# Patient Record
Sex: MALE | Race: WHITE | Employment: OTHER | URBAN - METROPOLITAN AREA
[De-identification: names, ages, dates, MRNs, and addresses within clinical notes are randomized per-mention and may not be internally consistent; named-entity substitution may affect disease eponyms.]

---

## 2017-01-01 ENCOUNTER — APPOINTMENT (OUTPATIENT)
Dept: CT IMAGING | Facility: HOSPITAL | Age: 82
End: 2017-01-01
Attending: EMERGENCY MEDICINE
Payer: MEDICARE

## 2017-01-01 ENCOUNTER — APPOINTMENT (OUTPATIENT)
Dept: CT IMAGING | Facility: HOSPITAL | Age: 82
DRG: 871 | End: 2017-01-01
Attending: INTERNAL MEDICINE
Payer: MEDICARE

## 2017-01-01 ENCOUNTER — OFFICE VISIT (OUTPATIENT)
Dept: RHEUMATOLOGY | Facility: CLINIC | Age: 82
End: 2017-01-01

## 2017-01-01 ENCOUNTER — LAB ENCOUNTER (OUTPATIENT)
Dept: LAB | Age: 82
End: 2017-01-01
Attending: INTERNAL MEDICINE
Payer: MEDICARE

## 2017-01-01 ENCOUNTER — ANESTHESIA (OUTPATIENT)
Dept: SURGERY | Facility: HOSPITAL | Age: 82
End: 2017-01-01

## 2017-01-01 ENCOUNTER — HOSPITAL ENCOUNTER (INPATIENT)
Facility: HOSPITAL | Age: 82
LOS: 6 days | Discharge: NURSING, REHAB OR LONG-TERM CARE | DRG: 871 | End: 2017-01-01
Attending: EMERGENCY MEDICINE | Admitting: HOSPITALIST
Payer: MEDICARE

## 2017-01-01 ENCOUNTER — SNF DISCHARGE (OUTPATIENT)
Dept: INTERNAL MEDICINE CLINIC | Age: 82
End: 2017-01-01

## 2017-01-01 ENCOUNTER — HOSPITAL ENCOUNTER (OUTPATIENT)
Facility: HOSPITAL | Age: 82
Setting detail: HOSPITAL OUTPATIENT SURGERY
Discharge: HOME | End: 2017-01-01
Attending: SURGERY | Admitting: SURGERY
Payer: MEDICARE

## 2017-01-01 ENCOUNTER — APPOINTMENT (OUTPATIENT)
Dept: CT IMAGING | Facility: HOSPITAL | Age: 82
DRG: 871 | End: 2017-01-01
Attending: EMERGENCY MEDICINE
Payer: MEDICARE

## 2017-01-01 ENCOUNTER — LAB REQUISITION (OUTPATIENT)
Dept: LAB | Facility: HOSPITAL | Age: 82
End: 2017-01-01
Attending: INTERNAL MEDICINE
Payer: MEDICARE

## 2017-01-01 ENCOUNTER — SURGERY (OUTPATIENT)
Age: 82
End: 2017-01-01

## 2017-01-01 ENCOUNTER — SNF VISIT (OUTPATIENT)
Dept: INTERNAL MEDICINE CLINIC | Age: 82
End: 2017-01-01

## 2017-01-01 ENCOUNTER — APPOINTMENT (OUTPATIENT)
Dept: GENERAL RADIOLOGY | Facility: HOSPITAL | Age: 82
DRG: 871 | End: 2017-01-01
Attending: EMERGENCY MEDICINE
Payer: MEDICARE

## 2017-01-01 ENCOUNTER — APPOINTMENT (OUTPATIENT)
Dept: CV DIAGNOSTICS | Facility: HOSPITAL | Age: 82
End: 2017-01-01
Attending: INTERNAL MEDICINE
Payer: MEDICARE

## 2017-01-01 ENCOUNTER — HOSPITAL ENCOUNTER (OUTPATIENT)
Facility: HOSPITAL | Age: 82
Setting detail: OBSERVATION
Discharge: HOME | End: 2017-01-01
Attending: EMERGENCY MEDICINE | Admitting: INTERNAL MEDICINE
Payer: MEDICARE

## 2017-01-01 ENCOUNTER — ANESTHESIA EVENT (OUTPATIENT)
Dept: SURGERY | Facility: HOSPITAL | Age: 82
End: 2017-01-01

## 2017-01-01 ENCOUNTER — APPOINTMENT (OUTPATIENT)
Dept: GENERAL RADIOLOGY | Facility: HOSPITAL | Age: 82
End: 2017-01-01
Attending: EMERGENCY MEDICINE
Payer: MEDICARE

## 2017-01-01 VITALS
SYSTOLIC BLOOD PRESSURE: 191 MMHG | HEART RATE: 67 BPM | OXYGEN SATURATION: 95 % | WEIGHT: 156.75 LBS | RESPIRATION RATE: 20 BRPM | DIASTOLIC BLOOD PRESSURE: 95 MMHG | BODY MASS INDEX: 25 KG/M2 | TEMPERATURE: 98 F

## 2017-01-01 VITALS
HEART RATE: 63 BPM | TEMPERATURE: 99 F | DIASTOLIC BLOOD PRESSURE: 76 MMHG | SYSTOLIC BLOOD PRESSURE: 133 MMHG | WEIGHT: 155.31 LBS | OXYGEN SATURATION: 99 % | RESPIRATION RATE: 14 BRPM | BODY MASS INDEX: 25 KG/M2

## 2017-01-01 VITALS
DIASTOLIC BLOOD PRESSURE: 80 MMHG | HEART RATE: 88 BPM | OXYGEN SATURATION: 95 % | RESPIRATION RATE: 24 BRPM | SYSTOLIC BLOOD PRESSURE: 154 MMHG | WEIGHT: 146 LBS | BODY MASS INDEX: 24 KG/M2

## 2017-01-01 VITALS
HEIGHT: 72 IN | SYSTOLIC BLOOD PRESSURE: 112 MMHG | DIASTOLIC BLOOD PRESSURE: 72 MMHG | BODY MASS INDEX: 20.32 KG/M2 | RESPIRATION RATE: 18 BRPM | HEART RATE: 60 BPM | WEIGHT: 150 LBS

## 2017-01-01 VITALS
DIASTOLIC BLOOD PRESSURE: 76 MMHG | RESPIRATION RATE: 20 BRPM | TEMPERATURE: 98 F | OXYGEN SATURATION: 97 % | SYSTOLIC BLOOD PRESSURE: 162 MMHG | HEART RATE: 62 BPM

## 2017-01-01 VITALS
RESPIRATION RATE: 18 BRPM | HEART RATE: 74 BPM | TEMPERATURE: 98 F | DIASTOLIC BLOOD PRESSURE: 68 MMHG | OXYGEN SATURATION: 97 % | SYSTOLIC BLOOD PRESSURE: 118 MMHG

## 2017-01-01 VITALS
TEMPERATURE: 98 F | HEIGHT: 66 IN | BODY MASS INDEX: 24.11 KG/M2 | RESPIRATION RATE: 16 BRPM | WEIGHT: 150 LBS | OXYGEN SATURATION: 100 % | SYSTOLIC BLOOD PRESSURE: 145 MMHG | HEART RATE: 67 BPM | DIASTOLIC BLOOD PRESSURE: 65 MMHG

## 2017-01-01 VITALS
RESPIRATION RATE: 18 BRPM | HEART RATE: 62 BPM | TEMPERATURE: 97 F | DIASTOLIC BLOOD PRESSURE: 68 MMHG | SYSTOLIC BLOOD PRESSURE: 132 MMHG | OXYGEN SATURATION: 98 %

## 2017-01-01 VITALS — HEART RATE: 74 BPM | RESPIRATION RATE: 16 BRPM | SYSTOLIC BLOOD PRESSURE: 158 MMHG | DIASTOLIC BLOOD PRESSURE: 72 MMHG

## 2017-01-01 DIAGNOSIS — H35.30 MACULAR DEGENERATION, BILATERAL: ICD-10-CM

## 2017-01-01 DIAGNOSIS — E78.2 COMBINED HYPERLIPIDEMIA ASSOCIATED WITH TYPE 2 DIABETES MELLITUS (HCC): ICD-10-CM

## 2017-01-01 DIAGNOSIS — E55.9 VITAMIN D DEFICIENCY: ICD-10-CM

## 2017-01-01 DIAGNOSIS — I15.2 HYPERTENSION ASSOCIATED WITH DIABETES (HCC): ICD-10-CM

## 2017-01-01 DIAGNOSIS — N18.4 STAGE 4 CHRONIC KIDNEY DISEASE DUE TO TYPE 2 DIABETES MELLITUS (HCC): ICD-10-CM

## 2017-01-01 DIAGNOSIS — E03.9 IDIOPATHIC HYPOTHYROIDISM: ICD-10-CM

## 2017-01-01 DIAGNOSIS — K29.60 EROSIVE GASTRITIS: ICD-10-CM

## 2017-01-01 DIAGNOSIS — I10 HTN (HYPERTENSION): ICD-10-CM

## 2017-01-01 DIAGNOSIS — A41.9 SEPSIS, DUE TO UNSPECIFIED ORGANISM: ICD-10-CM

## 2017-01-01 DIAGNOSIS — E11.8 DIABETES MELLITUS WITH MULTIPLE COMPLICATIONS (HCC): ICD-10-CM

## 2017-01-01 DIAGNOSIS — L40.50 PSA (PSORIATIC ARTHRITIS) (HCC): Primary | ICD-10-CM

## 2017-01-01 DIAGNOSIS — E11.59 HYPERTENSION ASSOCIATED WITH DIABETES (HCC): ICD-10-CM

## 2017-01-01 DIAGNOSIS — C7A.8 NEUROENDOCRINE CARCINOMA (HCC): ICD-10-CM

## 2017-01-01 DIAGNOSIS — N39.0 URINARY TRACT INFECTION WITH HEMATURIA, SITE UNSPECIFIED: Primary | ICD-10-CM

## 2017-01-01 DIAGNOSIS — N30.01 ACUTE CYSTITIS WITH HEMATURIA: Primary | ICD-10-CM

## 2017-01-01 DIAGNOSIS — R31.9 URINARY TRACT INFECTION WITH HEMATURIA, SITE UNSPECIFIED: Primary | ICD-10-CM

## 2017-01-01 DIAGNOSIS — N40.1 BENIGN NON-NODULAR PROSTATIC HYPERPLASIA WITH LOWER URINARY TRACT SYMPTOMS: ICD-10-CM

## 2017-01-01 DIAGNOSIS — R53.1 WEAKNESS: ICD-10-CM

## 2017-01-01 DIAGNOSIS — D51.0 PERNICIOUS ANEMIA: ICD-10-CM

## 2017-01-01 DIAGNOSIS — M47.812 OSTEOARTHRITIS OF CERVICAL SPINE, UNSPECIFIED SPINAL OSTEOARTHRITIS COMPLICATION STATUS: ICD-10-CM

## 2017-01-01 DIAGNOSIS — H91.93 BILATERAL HEARING LOSS, UNSPECIFIED HEARING LOSS TYPE: ICD-10-CM

## 2017-01-01 DIAGNOSIS — D64.9 ANEMIA: Primary | ICD-10-CM

## 2017-01-01 DIAGNOSIS — E11.9 DM (DIABETES MELLITUS) (HCC): ICD-10-CM

## 2017-01-01 DIAGNOSIS — R55 SYNCOPE AND COLLAPSE: Primary | ICD-10-CM

## 2017-01-01 DIAGNOSIS — E87.6 HYPOKALEMIA: ICD-10-CM

## 2017-01-01 DIAGNOSIS — E11.69 COMBINED HYPERLIPIDEMIA ASSOCIATED WITH TYPE 2 DIABETES MELLITUS (HCC): ICD-10-CM

## 2017-01-01 DIAGNOSIS — E11.22 STAGE 4 CHRONIC KIDNEY DISEASE DUE TO TYPE 2 DIABETES MELLITUS (HCC): ICD-10-CM

## 2017-01-01 DIAGNOSIS — L40.9 PSORIASIS: ICD-10-CM

## 2017-01-01 DIAGNOSIS — I10 HTN (HYPERTENSION): Primary | ICD-10-CM

## 2017-01-01 DIAGNOSIS — E11.9 DIABETES MELLITUS (HCC): Primary | ICD-10-CM

## 2017-01-01 DIAGNOSIS — R69 ILLNESS: ICD-10-CM

## 2017-01-01 DIAGNOSIS — R53.1 WEAKNESS GENERALIZED: ICD-10-CM

## 2017-01-01 LAB
25-HYDROXYVITAMIN D (TOTAL): 28.6 NG/ML (ref 30–100)
ALBUMIN SERPL-MCNC: 2.6 G/DL (ref 3.5–4.8)
ALBUMIN SERPL-MCNC: 2.8 G/DL (ref 3.5–4.8)
ALBUMIN SERPL-MCNC: 3.1 G/DL (ref 3.5–4.8)
ALP LIVER SERPL-CCNC: 45 U/L (ref 45–117)
ALP LIVER SERPL-CCNC: 45 U/L (ref 45–117)
ALP LIVER SERPL-CCNC: 56 U/L (ref 45–117)
ALT SERPL-CCNC: 14 U/L (ref 17–63)
ALT SERPL-CCNC: 14 U/L (ref 17–63)
ALT SERPL-CCNC: 19 U/L (ref 17–63)
ANTIBODY SCREEN: NEGATIVE
APTT PPP: 27.3 SECONDS (ref 25–34)
AST SERPL-CCNC: 13 U/L (ref 15–41)
AST SERPL-CCNC: 14 U/L (ref 15–41)
AST SERPL-CCNC: 17 U/L (ref 15–41)
ATRIAL RATE: 116 BPM
BASOPHILS # BLD AUTO: 0.01 X10(3) UL (ref 0–0.1)
BASOPHILS # BLD AUTO: 0.02 X10(3) UL (ref 0–0.1)
BASOPHILS # BLD AUTO: 0.03 X10(3) UL (ref 0–0.1)
BASOPHILS # BLD AUTO: 0.05 X10(3) UL (ref 0–0.1)
BASOPHILS # BLD AUTO: 0.08 X10(3) UL (ref 0–0.1)
BASOPHILS NFR BLD AUTO: 0.1 %
BASOPHILS NFR BLD AUTO: 0.3 %
BASOPHILS NFR BLD AUTO: 0.3 %
BASOPHILS NFR BLD AUTO: 0.7 %
BASOPHILS NFR BLD AUTO: 1.3 %
BILIRUB SERPL-MCNC: 0.5 MG/DL (ref 0.1–2)
BILIRUB SERPL-MCNC: 0.6 MG/DL (ref 0.1–2)
BILIRUB SERPL-MCNC: 1.1 MG/DL (ref 0.1–2)
BILIRUB UR QL STRIP.AUTO: NEGATIVE
BLOOD TYPE BARCODE: 6200
BUN BLD-MCNC: 30 MG/DL (ref 8–20)
BUN BLD-MCNC: 33 MG/DL (ref 8–20)
BUN BLD-MCNC: 37 MG/DL (ref 8–20)
BUN BLD-MCNC: 41 MG/DL (ref 8–20)
BUN BLD-MCNC: 42 MG/DL (ref 8–20)
BUN BLD-MCNC: 47 MG/DL (ref 8–20)
BUN BLD-MCNC: 49 MG/DL (ref 8–20)
CALCIUM BLD-MCNC: 7.6 MG/DL (ref 8.3–10.3)
CALCIUM BLD-MCNC: 7.6 MG/DL (ref 8.3–10.3)
CALCIUM BLD-MCNC: 8 MG/DL (ref 8.3–10.3)
CALCIUM BLD-MCNC: 8.1 MG/DL (ref 8.3–10.3)
CALCIUM BLD-MCNC: 8.5 MG/DL (ref 8.3–10.3)
CALCIUM BLD-MCNC: 8.6 MG/DL (ref 8.3–10.3)
CALCIUM BLD-MCNC: 8.9 MG/DL (ref 8.3–10.3)
CHLORIDE: 106 MMOL/L (ref 101–111)
CHLORIDE: 110 MMOL/L (ref 101–111)
CHLORIDE: 111 MMOL/L (ref 101–111)
CHLORIDE: 112 MMOL/L (ref 101–111)
CHLORIDE: 114 MMOL/L (ref 101–111)
CLARITY UR REFRACT.AUTO: CLEAR
CO2: 19 MMOL/L (ref 22–32)
CO2: 19 MMOL/L (ref 22–32)
CO2: 21 MMOL/L (ref 22–32)
CO2: 22 MMOL/L (ref 22–32)
CO2: 22 MMOL/L (ref 22–32)
CO2: 23 MMOL/L (ref 22–32)
CO2: 23 MMOL/L (ref 22–32)
COLOR UR AUTO: YELLOW
CREAT BLD-MCNC: 2.01 MG/DL (ref 0.7–1.3)
CREAT BLD-MCNC: 2.04 MG/DL (ref 0.7–1.3)
CREAT BLD-MCNC: 2.06 MG/DL (ref 0.7–1.3)
CREAT BLD-MCNC: 2.13 MG/DL (ref 0.7–1.3)
CREAT BLD-MCNC: 2.31 MG/DL (ref 0.7–1.3)
CREAT BLD-MCNC: 2.34 MG/DL (ref 0.7–1.3)
CREAT BLD-MCNC: 2.35 MG/DL (ref 0.7–1.3)
CREAT BLD-MCNC: 2.44 MG/DL (ref 0.7–1.3)
CREAT BLD-MCNC: 2.65 MG/DL (ref 0.7–1.3)
CREAT BLD-MCNC: 2.65 MG/DL (ref 0.7–1.3)
EOSINOPHIL # BLD AUTO: 0.01 X10(3) UL (ref 0–0.3)
EOSINOPHIL # BLD AUTO: 0.03 X10(3) UL (ref 0–0.3)
EOSINOPHIL # BLD AUTO: 0.3 X10(3) UL (ref 0–0.3)
EOSINOPHIL # BLD AUTO: 0.37 X10(3) UL (ref 0–0.3)
EOSINOPHIL # BLD AUTO: 0.39 X10(3) UL (ref 0–0.3)
EOSINOPHIL # BLD AUTO: 0.49 X10(3) UL (ref 0–0.3)
EOSINOPHIL # BLD AUTO: 0.62 X10(3) UL (ref 0–0.3)
EOSINOPHIL NFR BLD AUTO: 0.1 %
EOSINOPHIL NFR BLD AUTO: 0.2 %
EOSINOPHIL NFR BLD AUTO: 4.2 %
EOSINOPHIL NFR BLD AUTO: 4.9 %
EOSINOPHIL NFR BLD AUTO: 6.2 %
EOSINOPHIL NFR BLD AUTO: 6.5 %
EOSINOPHIL NFR BLD AUTO: 6.5 %
ERYTHROCYTE [DISTWIDTH] IN BLOOD BY AUTOMATED COUNT: 15.6 % (ref 11.5–16)
ERYTHROCYTE [DISTWIDTH] IN BLOOD BY AUTOMATED COUNT: 15.9 % (ref 11.5–16)
ERYTHROCYTE [DISTWIDTH] IN BLOOD BY AUTOMATED COUNT: 17 % (ref 11.5–16)
ERYTHROCYTE [DISTWIDTH] IN BLOOD BY AUTOMATED COUNT: 17.1 % (ref 11.5–16)
ERYTHROCYTE [DISTWIDTH] IN BLOOD BY AUTOMATED COUNT: 17.2 % (ref 11.5–16)
ERYTHROCYTE [DISTWIDTH] IN BLOOD BY AUTOMATED COUNT: 17.3 % (ref 11.5–16)
ERYTHROCYTE [DISTWIDTH] IN BLOOD BY AUTOMATED COUNT: 17.5 % (ref 11.5–16)
ERYTHROCYTE [DISTWIDTH] IN BLOOD BY AUTOMATED COUNT: 17.6 % (ref 11.5–16)
ERYTHROCYTE [DISTWIDTH] IN BLOOD BY AUTOMATED COUNT: 18.1 % (ref 11.5–16)
GLUCOSE BLD-MCNC: 103 MG/DL (ref 65–99)
GLUCOSE BLD-MCNC: 116 MG/DL (ref 65–99)
GLUCOSE BLD-MCNC: 117 MG/DL (ref 70–99)
GLUCOSE BLD-MCNC: 119 MG/DL (ref 70–99)
GLUCOSE BLD-MCNC: 120 MG/DL (ref 65–99)
GLUCOSE BLD-MCNC: 121 MG/DL (ref 65–99)
GLUCOSE BLD-MCNC: 124 MG/DL (ref 70–99)
GLUCOSE BLD-MCNC: 125 MG/DL (ref 65–99)
GLUCOSE BLD-MCNC: 131 MG/DL (ref 65–99)
GLUCOSE BLD-MCNC: 139 MG/DL (ref 65–99)
GLUCOSE BLD-MCNC: 139 MG/DL (ref 70–99)
GLUCOSE BLD-MCNC: 140 MG/DL (ref 65–99)
GLUCOSE BLD-MCNC: 141 MG/DL (ref 65–99)
GLUCOSE BLD-MCNC: 145 MG/DL (ref 65–99)
GLUCOSE BLD-MCNC: 160 MG/DL (ref 65–99)
GLUCOSE BLD-MCNC: 165 MG/DL (ref 65–99)
GLUCOSE BLD-MCNC: 165 MG/DL (ref 65–99)
GLUCOSE BLD-MCNC: 166 MG/DL (ref 65–99)
GLUCOSE BLD-MCNC: 167 MG/DL (ref 65–99)
GLUCOSE BLD-MCNC: 168 MG/DL (ref 65–99)
GLUCOSE BLD-MCNC: 174 MG/DL (ref 65–99)
GLUCOSE BLD-MCNC: 176 MG/DL (ref 65–99)
GLUCOSE BLD-MCNC: 179 MG/DL (ref 65–99)
GLUCOSE BLD-MCNC: 181 MG/DL (ref 65–99)
GLUCOSE BLD-MCNC: 187 MG/DL (ref 65–99)
GLUCOSE BLD-MCNC: 199 MG/DL (ref 65–99)
GLUCOSE BLD-MCNC: 232 MG/DL (ref 65–99)
GLUCOSE BLD-MCNC: 261 MG/DL (ref 65–99)
GLUCOSE BLD-MCNC: 65 MG/DL (ref 70–99)
GLUCOSE BLD-MCNC: 83 MG/DL (ref 70–99)
GLUCOSE BLD-MCNC: 90 MG/DL (ref 65–99)
GLUCOSE BLD-MCNC: 95 MG/DL (ref 70–99)
GLUCOSE UR STRIP.AUTO-MCNC: 50 MG/DL
GLUCOSE UR STRIP.AUTO-MCNC: NEGATIVE MG/DL
GLUCOSE UR STRIP.AUTO-MCNC: NEGATIVE MG/DL
HAV AB SERPL IA-ACNC: 1523 PG/ML (ref 193–986)
HAV IGM SER QL: 2.1 MG/DL (ref 1.7–3)
HCT VFR BLD AUTO: 20 % (ref 37–53)
HCT VFR BLD AUTO: 24.8 % (ref 37–53)
HCT VFR BLD AUTO: 25.2 % (ref 37–53)
HCT VFR BLD AUTO: 25.6 % (ref 37–53)
HCT VFR BLD AUTO: 26.5 % (ref 37–53)
HCT VFR BLD AUTO: 26.7 % (ref 37–53)
HCT VFR BLD AUTO: 27 % (ref 37–53)
HCT VFR BLD AUTO: 28.1 % (ref 37–53)
HCT VFR BLD AUTO: 28.3 % (ref 37–53)
HGB BLD-MCNC: 6.6 G/DL (ref 13–17)
HGB BLD-MCNC: 8.3 G/DL (ref 13–17)
HGB BLD-MCNC: 8.4 G/DL (ref 13–17)
HGB BLD-MCNC: 8.5 G/DL (ref 13–17)
HGB BLD-MCNC: 8.8 G/DL (ref 13–17)
HGB BLD-MCNC: 8.9 G/DL (ref 13–17)
HGB BLD-MCNC: 8.9 G/DL (ref 13–17)
HGB BLD-MCNC: 9.4 G/DL (ref 13–17)
HGB BLD-MCNC: 9.4 G/DL (ref 13–17)
IMMATURE GRANULOCYTE COUNT: 0.03 X10(3) UL (ref 0–1)
IMMATURE GRANULOCYTE COUNT: 0.08 X10(3) UL (ref 0–1)
IMMATURE GRANULOCYTE COUNT: 0.12 X10(3) UL (ref 0–1)
IMMATURE GRANULOCYTE COUNT: 0.12 X10(3) UL (ref 0–1)
IMMATURE GRANULOCYTE COUNT: 0.2 X10(3) UL (ref 0–1)
IMMATURE GRANULOCYTE COUNT: 0.28 X10(3) UL (ref 0–1)
IMMATURE GRANULOCYTE COUNT: 0.35 X10(3) UL (ref 0–1)
IMMATURE GRANULOCYTE RATIO %: 0.5 %
IMMATURE GRANULOCYTE RATIO %: 0.9 %
IMMATURE GRANULOCYTE RATIO %: 1.1 %
IMMATURE GRANULOCYTE RATIO %: 1.5 %
IMMATURE GRANULOCYTE RATIO %: 1.8 %
IMMATURE GRANULOCYTE RATIO %: 2.7 %
IMMATURE GRANULOCYTE RATIO %: 3.6 %
INR BLD: 1.19 (ref 0.89–1.11)
KETONES UR STRIP.AUTO-MCNC: NEGATIVE MG/DL
LACTIC ACID: 0.8 MMOL/L (ref 0.5–2)
LACTIC ACID: 0.9 MMOL/L (ref 0.5–2)
LACTIC ACID: 1.8 MMOL/L (ref 0.5–2)
LEUKOCYTE ESTERASE UR QL STRIP.AUTO: NEGATIVE
LYMPHOCYTES # BLD AUTO: 0.14 X10(3) UL (ref 0.9–4)
LYMPHOCYTES # BLD AUTO: 0.45 X10(3) UL (ref 0.9–4)
LYMPHOCYTES # BLD AUTO: 0.5 X10(3) UL (ref 0.9–4)
LYMPHOCYTES # BLD AUTO: 0.78 X10(3) UL (ref 0.9–4)
LYMPHOCYTES # BLD AUTO: 0.86 X10(3) UL (ref 0.9–4)
LYMPHOCYTES # BLD AUTO: 0.97 X10(3) UL (ref 0.9–4)
LYMPHOCYTES # BLD AUTO: 1.16 X10(3) UL (ref 0.9–4)
LYMPHOCYTES NFR BLD AUTO: 0.9 %
LYMPHOCYTES NFR BLD AUTO: 11.5 %
LYMPHOCYTES NFR BLD AUTO: 12.1 %
LYMPHOCYTES NFR BLD AUTO: 16.3 %
LYMPHOCYTES NFR BLD AUTO: 3.8 %
LYMPHOCYTES NFR BLD AUTO: 6.2 %
LYMPHOCYTES NFR BLD AUTO: 9.8 %
M PROTEIN MFR SERPL ELPH: 5.8 G/DL (ref 6.1–8.3)
M PROTEIN MFR SERPL ELPH: 6 G/DL (ref 6.1–8.3)
M PROTEIN MFR SERPL ELPH: 6.4 G/DL (ref 6.1–8.3)
MCH RBC QN AUTO: 34.6 PG (ref 27–33.2)
MCH RBC QN AUTO: 34.9 PG (ref 27–33.2)
MCH RBC QN AUTO: 35 PG (ref 27–33.2)
MCH RBC QN AUTO: 35.3 PG (ref 27–33.2)
MCH RBC QN AUTO: 35.3 PG (ref 27–33.2)
MCH RBC QN AUTO: 35.7 PG (ref 27–33.2)
MCH RBC QN AUTO: 36.5 PG (ref 27–33.2)
MCH RBC QN AUTO: 36.8 PG (ref 27–33.2)
MCH RBC QN AUTO: 37.1 PG (ref 27–33.2)
MCHC RBC AUTO-ENTMCNC: 32.4 G/DL (ref 31–37)
MCHC RBC AUTO-ENTMCNC: 33 G/DL (ref 31–37)
MCHC RBC AUTO-ENTMCNC: 33.2 G/DL (ref 31–37)
MCHC RBC AUTO-ENTMCNC: 33.5 G/DL (ref 31–37)
MCHC RBC AUTO-ENTMCNC: 33.6 G/DL (ref 31–37)
MCHC RBC AUTO-ENTMCNC: 33.7 G/DL (ref 31–37)
MCHC RBC AUTO-ENTMCNC: 33.9 G/DL (ref 31–37)
MCV RBC AUTO: 104.5 FL (ref 80–99)
MCV RBC AUTO: 105.1 FL (ref 80–99)
MCV RBC AUTO: 106.4 FL (ref 80–99)
MCV RBC AUTO: 106.4 FL (ref 80–99)
MCV RBC AUTO: 107.2 FL (ref 80–99)
MCV RBC AUTO: 108 FL (ref 80–99)
MCV RBC AUTO: 108.2 FL (ref 80–99)
MCV RBC AUTO: 108.8 FL (ref 80–99)
MCV RBC AUTO: 112.4 FL (ref 80–99)
MONOCYTES # BLD AUTO: 0.26 X10(3) UL (ref 0.1–0.6)
MONOCYTES # BLD AUTO: 0.27 X10(3) UL (ref 0.1–0.6)
MONOCYTES # BLD AUTO: 0.45 X10(3) UL (ref 0.1–0.6)
MONOCYTES # BLD AUTO: 0.62 X10(3) UL (ref 0.1–0.6)
MONOCYTES # BLD AUTO: 0.83 X10(3) UL (ref 0.1–0.6)
MONOCYTES # BLD AUTO: 1.13 X10(3) UL (ref 0.1–0.6)
MONOCYTES # BLD AUTO: 1.19 X10(3) UL (ref 0.1–0.6)
MONOCYTES NFR BLD AUTO: 1.7 %
MONOCYTES NFR BLD AUTO: 11.1 %
MONOCYTES NFR BLD AUTO: 12.4 %
MONOCYTES NFR BLD AUTO: 19 %
MONOCYTES NFR BLD AUTO: 3.7 %
MONOCYTES NFR BLD AUTO: 4.7 %
MONOCYTES NFR BLD AUTO: 5.7 %
NEUTROPHIL ABS PRELIM: 11.84 X10 (3) UL (ref 1.3–6.7)
NEUTROPHIL ABS PRELIM: 14.82 X10 (3) UL (ref 1.3–6.7)
NEUTROPHIL ABS PRELIM: 3.38 X10 (3) UL (ref 1.3–6.7)
NEUTROPHIL ABS PRELIM: 5.07 X10 (3) UL (ref 1.3–6.7)
NEUTROPHIL ABS PRELIM: 6.11 X10 (3) UL (ref 1.3–6.7)
NEUTROPHIL ABS PRELIM: 6.19 X10 (3) UL (ref 1.3–6.7)
NEUTROPHIL ABS PRELIM: 6.24 X10 (3) UL (ref 1.3–6.7)
NEUTROPHILS # BLD AUTO: 11.84 X10(3) UL (ref 1.3–6.7)
NEUTROPHILS # BLD AUTO: 14.82 X10(3) UL (ref 1.3–6.7)
NEUTROPHILS # BLD AUTO: 3.38 X10(3) UL (ref 1.3–6.7)
NEUTROPHILS # BLD AUTO: 5.07 X10(3) UL (ref 1.3–6.7)
NEUTROPHILS # BLD AUTO: 6.11 X10(3) UL (ref 1.3–6.7)
NEUTROPHILS # BLD AUTO: 6.19 X10(3) UL (ref 1.3–6.7)
NEUTROPHILS # BLD AUTO: 6.24 X10(3) UL (ref 1.3–6.7)
NEUTROPHILS NFR BLD AUTO: 56.7 %
NEUTROPHILS NFR BLD AUTO: 65.1 %
NEUTROPHILS NFR BLD AUTO: 67.5 %
NEUTROPHILS NFR BLD AUTO: 77.8 %
NEUTROPHILS NFR BLD AUTO: 84.7 %
NEUTROPHILS NFR BLD AUTO: 90.3 %
NEUTROPHILS NFR BLD AUTO: 95.4 %
NITRITE UR QL STRIP.AUTO: NEGATIVE
P AXIS: 42 DEGREES
P-R INTERVAL: 200 MS
PH UR STRIP.AUTO: 5 [PH] (ref 4.5–8)
PH UR STRIP.AUTO: 5 [PH] (ref 4.5–8)
PH UR STRIP.AUTO: 6 [PH] (ref 4.5–8)
PLATELET # BLD AUTO: 123 10(3)UL (ref 150–450)
PLATELET # BLD AUTO: 148 10(3)UL (ref 150–450)
PLATELET # BLD AUTO: 149 10(3)UL (ref 150–450)
PLATELET # BLD AUTO: 158 10(3)UL (ref 150–450)
PLATELET # BLD AUTO: 164 10(3)UL (ref 150–450)
PLATELET # BLD AUTO: 177 10(3)UL (ref 150–450)
PLATELET # BLD AUTO: 193 10(3)UL (ref 150–450)
PLATELET # BLD AUTO: 230 10(3)UL (ref 150–450)
PLATELET # BLD AUTO: 264 10(3)UL (ref 150–450)
PLATELET MORPHOLOGY: NORMAL
POTASSIUM SERPL-SCNC: 3.4 MMOL/L (ref 3.6–5.1)
POTASSIUM SERPL-SCNC: 3.6 MMOL/L (ref 3.6–5.1)
POTASSIUM SERPL-SCNC: 3.8 MMOL/L (ref 3.6–5.1)
POTASSIUM SERPL-SCNC: 3.8 MMOL/L (ref 3.6–5.1)
POTASSIUM SERPL-SCNC: 3.9 MMOL/L (ref 3.6–5.1)
POTASSIUM SERPL-SCNC: 4 MMOL/L (ref 3.6–5.1)
POTASSIUM SERPL-SCNC: 4.2 MMOL/L (ref 3.6–5.1)
PROT UR STRIP.AUTO-MCNC: 30 MG/DL
PROT UR STRIP.AUTO-MCNC: 30 MG/DL
PROT UR STRIP.AUTO-MCNC: NEGATIVE MG/DL
PSA SERPL DL<=0.01 NG/ML-MCNC: 15.2 SECONDS (ref 12–14.3)
Q-T INTERVAL: 310 MS
QRS DURATION: 82 MS
QTC CALCULATION (BEZET): 430 MS
R AXIS: -26 DEGREES
RBC # BLD AUTO: 1.78 X10(6)UL (ref 3.8–5.8)
RBC # BLD AUTO: 2.28 X10(6)UL (ref 3.8–5.8)
RBC # BLD AUTO: 2.33 X10(6)UL (ref 3.8–5.8)
RBC # BLD AUTO: 2.37 X10(6)UL (ref 3.8–5.8)
RBC # BLD AUTO: 2.49 X10(6)UL (ref 3.8–5.8)
RBC # BLD AUTO: 2.49 X10(6)UL (ref 3.8–5.8)
RBC # BLD AUTO: 2.57 X10(6)UL (ref 3.8–5.8)
RBC # BLD AUTO: 2.66 X10(6)UL (ref 3.8–5.8)
RBC # BLD AUTO: 2.69 X10(6)UL (ref 3.8–5.8)
RBC #/AREA URNS AUTO: >10 /HPF
RED CELL DISTRIBUTION WIDTH-SD: 62.4 FL (ref 35.1–46.3)
RED CELL DISTRIBUTION WIDTH-SD: 64.9 FL (ref 35.1–46.3)
RED CELL DISTRIBUTION WIDTH-SD: 65.9 FL (ref 35.1–46.3)
RED CELL DISTRIBUTION WIDTH-SD: 67 FL (ref 35.1–46.3)
RED CELL DISTRIBUTION WIDTH-SD: 67.2 FL (ref 35.1–46.3)
RED CELL DISTRIBUTION WIDTH-SD: 67.6 FL (ref 35.1–46.3)
RED CELL DISTRIBUTION WIDTH-SD: 67.8 FL (ref 35.1–46.3)
RED CELL DISTRIBUTION WIDTH-SD: 69.4 FL (ref 35.1–46.3)
RED CELL DISTRIBUTION WIDTH-SD: 71.9 FL (ref 35.1–46.3)
RH BLOOD TYPE: POSITIVE
SODIUM SERPL-SCNC: 138 MMOL/L (ref 136–144)
SODIUM SERPL-SCNC: 141 MMOL/L (ref 136–144)
SODIUM SERPL-SCNC: 141 MMOL/L (ref 136–144)
SODIUM SERPL-SCNC: 142 MMOL/L (ref 136–144)
SODIUM SERPL-SCNC: 142 MMOL/L (ref 136–144)
SODIUM SERPL-SCNC: 143 MMOL/L (ref 136–144)
SODIUM SERPL-SCNC: 143 MMOL/L (ref 136–144)
SP GR UR STRIP.AUTO: 1.01 (ref 1–1.03)
T AXIS: 14 DEGREES
UROBILINOGEN UR STRIP.AUTO-MCNC: <2 MG/DL
VENTRICULAR RATE: 116 BPM
WBC # BLD AUTO: 12.9 X10(3) UL (ref 4–13)
WBC # BLD AUTO: 13.1 X10(3) UL (ref 4–13)
WBC # BLD AUTO: 15.5 X10(3) UL (ref 4–13)
WBC # BLD AUTO: 6 X10(3) UL (ref 4–13)
WBC # BLD AUTO: 7.2 X10(3) UL (ref 4–13)
WBC # BLD AUTO: 7.5 X10(3) UL (ref 4–13)
WBC # BLD AUTO: 8 X10(3) UL (ref 4–13)
WBC # BLD AUTO: 8.3 X10(3) UL (ref 4–13)
WBC # BLD AUTO: 9.6 X10(3) UL (ref 4–13)
WBC #/AREA URNS AUTO: >50 /HPF
WBC CLUMPS UR QL AUTO: PRESENT
WBC CLUMPS UR QL AUTO: PRESENT

## 2017-01-01 PROCEDURE — 96365 THER/PROPH/DIAG IV INF INIT: CPT

## 2017-01-01 PROCEDURE — 88108 CYTOPATH CONCENTRATE TECH: CPT | Performed by: UROLOGY

## 2017-01-01 PROCEDURE — 84443 ASSAY THYROID STIM HORMONE: CPT | Performed by: HOSPITALIST

## 2017-01-01 PROCEDURE — 80048 BASIC METABOLIC PNL TOTAL CA: CPT | Performed by: NURSE PRACTITIONER

## 2017-01-01 PROCEDURE — 82962 GLUCOSE BLOOD TEST: CPT

## 2017-01-01 PROCEDURE — 81001 URINALYSIS AUTO W/SCOPE: CPT | Performed by: EMERGENCY MEDICINE

## 2017-01-01 PROCEDURE — 99285 EMERGENCY DEPT VISIT HI MDM: CPT

## 2017-01-01 PROCEDURE — 87086 URINE CULTURE/COLONY COUNT: CPT | Performed by: EMERGENCY MEDICINE

## 2017-01-01 PROCEDURE — 87077 CULTURE AEROBIC IDENTIFY: CPT | Performed by: EMERGENCY MEDICINE

## 2017-01-01 PROCEDURE — 82565 ASSAY OF CREATININE: CPT | Performed by: INTERNAL MEDICINE

## 2017-01-01 PROCEDURE — 93010 ELECTROCARDIOGRAM REPORT: CPT

## 2017-01-01 PROCEDURE — 99213 OFFICE O/P EST LOW 20 MIN: CPT | Performed by: INTERNAL MEDICINE

## 2017-01-01 PROCEDURE — 85025 COMPLETE CBC W/AUTO DIFF WBC: CPT | Performed by: EMERGENCY MEDICINE

## 2017-01-01 PROCEDURE — 70450 CT HEAD/BRAIN W/O DYE: CPT | Performed by: EMERGENCY MEDICINE

## 2017-01-01 PROCEDURE — 83690 ASSAY OF LIPASE: CPT | Performed by: EMERGENCY MEDICINE

## 2017-01-01 PROCEDURE — 82043 UR ALBUMIN QUANTITATIVE: CPT | Performed by: INTERNAL MEDICINE

## 2017-01-01 PROCEDURE — 87076 CULTURE ANAEROBE IDENT EACH: CPT | Performed by: EMERGENCY MEDICINE

## 2017-01-01 PROCEDURE — 85025 COMPLETE CBC W/AUTO DIFF WBC: CPT | Performed by: INTERNAL MEDICINE

## 2017-01-01 PROCEDURE — 97166 OT EVAL MOD COMPLEX 45 MIN: CPT

## 2017-01-01 PROCEDURE — 87086 URINE CULTURE/COLONY COUNT: CPT | Performed by: INTERNAL MEDICINE

## 2017-01-01 PROCEDURE — 36415 COLL VENOUS BLD VENIPUNCTURE: CPT

## 2017-01-01 PROCEDURE — 81001 URINALYSIS AUTO W/SCOPE: CPT | Performed by: INTERNAL MEDICINE

## 2017-01-01 PROCEDURE — 81001 URINALYSIS AUTO W/SCOPE: CPT | Performed by: HOSPITALIST

## 2017-01-01 PROCEDURE — 85027 COMPLETE CBC AUTOMATED: CPT | Performed by: INTERNAL MEDICINE

## 2017-01-01 PROCEDURE — 97535 SELF CARE MNGMENT TRAINING: CPT

## 2017-01-01 PROCEDURE — 71010 XR CHEST AP PORTABLE  (CPT=71010): CPT | Performed by: EMERGENCY MEDICINE

## 2017-01-01 PROCEDURE — 87081 CULTURE SCREEN ONLY: CPT | Performed by: INTERNAL MEDICINE

## 2017-01-01 PROCEDURE — 96360 HYDRATION IV INFUSION INIT: CPT

## 2017-01-01 PROCEDURE — 80048 BASIC METABOLIC PNL TOTAL CA: CPT | Performed by: INTERNAL MEDICINE

## 2017-01-01 PROCEDURE — 96372 THER/PROPH/DIAG INJ SC/IM: CPT

## 2017-01-01 PROCEDURE — 99291 CRITICAL CARE FIRST HOUR: CPT

## 2017-01-01 PROCEDURE — 83605 ASSAY OF LACTIC ACID: CPT | Performed by: EMERGENCY MEDICINE

## 2017-01-01 PROCEDURE — 86901 BLOOD TYPING SEROLOGIC RH(D): CPT | Performed by: NURSE PRACTITIONER

## 2017-01-01 PROCEDURE — 87040 BLOOD CULTURE FOR BACTERIA: CPT | Performed by: EMERGENCY MEDICINE

## 2017-01-01 PROCEDURE — 93306 TTE W/DOPPLER COMPLETE: CPT | Performed by: INTERNAL MEDICINE

## 2017-01-01 PROCEDURE — 30233N1 TRANSFUSION OF NONAUTOLOGOUS RED BLOOD CELLS INTO PERIPHERAL VEIN, PERCUTANEOUS APPROACH: ICD-10-PCS | Performed by: INTERNAL MEDICINE

## 2017-01-01 PROCEDURE — 96361 HYDRATE IV INFUSION ADD-ON: CPT

## 2017-01-01 PROCEDURE — 99316 NF DSCHRG MGMT 30 MIN+: CPT | Performed by: NURSE PRACTITIONER

## 2017-01-01 PROCEDURE — 0YU50JZ SUPPLEMENT RIGHT INGUINAL REGION WITH SYNTHETIC SUBSTITUTE, OPEN APPROACH: ICD-10-PCS | Performed by: SURGERY

## 2017-01-01 PROCEDURE — 97110 THERAPEUTIC EXERCISES: CPT

## 2017-01-01 PROCEDURE — 87150 DNA/RNA AMPLIFIED PROBE: CPT | Performed by: EMERGENCY MEDICINE

## 2017-01-01 PROCEDURE — 83036 HEMOGLOBIN GLYCOSYLATED A1C: CPT | Performed by: INTERNAL MEDICINE

## 2017-01-01 PROCEDURE — 74176 CT ABD & PELVIS W/O CONTRAST: CPT | Performed by: INTERNAL MEDICINE

## 2017-01-01 PROCEDURE — 85025 COMPLETE CBC W/AUTO DIFF WBC: CPT | Performed by: NURSE PRACTITIONER

## 2017-01-01 PROCEDURE — 84484 ASSAY OF TROPONIN QUANT: CPT | Performed by: EMERGENCY MEDICINE

## 2017-01-01 PROCEDURE — 85025 COMPLETE CBC W/AUTO DIFF WBC: CPT | Performed by: HOSPITALIST

## 2017-01-01 PROCEDURE — 99310 SBSQ NF CARE HIGH MDM 45: CPT | Performed by: NURSE PRACTITIONER

## 2017-01-01 PROCEDURE — 83605 ASSAY OF LACTIC ACID: CPT | Performed by: NURSE PRACTITIONER

## 2017-01-01 PROCEDURE — 82306 VITAMIN D 25 HYDROXY: CPT

## 2017-01-01 PROCEDURE — 86900 BLOOD TYPING SEROLOGIC ABO: CPT | Performed by: NURSE PRACTITIONER

## 2017-01-01 PROCEDURE — 80053 COMPREHEN METABOLIC PANEL: CPT | Performed by: EMERGENCY MEDICINE

## 2017-01-01 PROCEDURE — 87086 URINE CULTURE/COLONY COUNT: CPT | Performed by: HOSPITALIST

## 2017-01-01 PROCEDURE — 96367 TX/PROPH/DG ADDL SEQ IV INF: CPT

## 2017-01-01 PROCEDURE — 86920 COMPATIBILITY TEST SPIN: CPT

## 2017-01-01 PROCEDURE — 87186 SC STD MICRODIL/AGAR DIL: CPT | Performed by: INTERNAL MEDICINE

## 2017-01-01 PROCEDURE — 83735 ASSAY OF MAGNESIUM: CPT

## 2017-01-01 PROCEDURE — 99307 SBSQ NF CARE SF MDM 10: CPT | Performed by: NURSE PRACTITIONER

## 2017-01-01 PROCEDURE — 97530 THERAPEUTIC ACTIVITIES: CPT

## 2017-01-01 PROCEDURE — 80048 BASIC METABOLIC PNL TOTAL CA: CPT

## 2017-01-01 PROCEDURE — 36430 TRANSFUSION BLD/BLD COMPNT: CPT

## 2017-01-01 PROCEDURE — 87086 URINE CULTURE/COLONY COUNT: CPT | Performed by: UROLOGY

## 2017-01-01 PROCEDURE — 97116 GAIT TRAINING THERAPY: CPT

## 2017-01-01 PROCEDURE — 85018 HEMOGLOBIN: CPT | Performed by: HOSPITALIST

## 2017-01-01 PROCEDURE — 86850 RBC ANTIBODY SCREEN: CPT | Performed by: NURSE PRACTITIONER

## 2017-01-01 PROCEDURE — 82570 ASSAY OF URINE CREATININE: CPT | Performed by: INTERNAL MEDICINE

## 2017-01-01 PROCEDURE — 85025 COMPLETE CBC W/AUTO DIFF WBC: CPT

## 2017-01-01 PROCEDURE — 80053 COMPREHEN METABOLIC PANEL: CPT

## 2017-01-01 PROCEDURE — 97162 PT EVAL MOD COMPLEX 30 MIN: CPT

## 2017-01-01 PROCEDURE — 87040 BLOOD CULTURE FOR BACTERIA: CPT | Performed by: HOSPITALIST

## 2017-01-01 PROCEDURE — 97161 PT EVAL LOW COMPLEX 20 MIN: CPT

## 2017-01-01 PROCEDURE — 82607 VITAMIN B-12: CPT

## 2017-01-01 PROCEDURE — 85610 PROTHROMBIN TIME: CPT | Performed by: EMERGENCY MEDICINE

## 2017-01-01 PROCEDURE — 81015 MICROSCOPIC EXAM OF URINE: CPT | Performed by: UROLOGY

## 2017-01-01 PROCEDURE — 93005 ELECTROCARDIOGRAM TRACING: CPT

## 2017-01-01 PROCEDURE — 85730 THROMBOPLASTIN TIME PARTIAL: CPT | Performed by: EMERGENCY MEDICINE

## 2017-01-01 PROCEDURE — 51701 INSERT BLADDER CATHETER: CPT

## 2017-01-01 DEVICE — POLYPROPLYLENE NONABSORBABLE SYNTHETIC SURGICAL MESH
Type: IMPLANTABLE DEVICE | Site: GROIN | Status: FUNCTIONAL
Brand: PROLENE

## 2017-01-01 RX ORDER — FOLIC ACID 1 MG/1
1 TABLET ORAL DAILY
Refills: 0 | Status: CANCELLED | OUTPATIENT
Start: 2017-01-01

## 2017-01-01 RX ORDER — PRAVASTATIN SODIUM 20 MG
20 TABLET ORAL NIGHTLY
Status: DISCONTINUED | OUTPATIENT
Start: 2017-01-01 | End: 2017-01-01

## 2017-01-01 RX ORDER — FAMOTIDINE 20 MG/1
20 TABLET ORAL DAILY
Status: DISCONTINUED | OUTPATIENT
Start: 2017-01-01 | End: 2017-01-01

## 2017-01-01 RX ORDER — CLOTRIMAZOLE AND BETAMETHASONE DIPROPIONATE 10; .64 MG/G; MG/G
1 CREAM TOPICAL 2 TIMES DAILY
COMMUNITY
End: 2018-01-01

## 2017-01-01 RX ORDER — ALFUZOSIN HYDROCHLORIDE 10 MG/1
10 TABLET, EXTENDED RELEASE ORAL
Qty: 30 TABLET | Refills: 3 | Status: SHIPPED | OUTPATIENT
Start: 2017-01-01 | End: 2017-01-01 | Stop reason: ALTCHOICE

## 2017-01-01 RX ORDER — SODIUM CHLORIDE 9 MG/ML
INJECTION, SOLUTION INTRAVENOUS CONTINUOUS
Status: DISCONTINUED | OUTPATIENT
Start: 2017-01-01 | End: 2017-01-01

## 2017-01-01 RX ORDER — POTASSIUM CHLORIDE 20 MEQ/1
20 TABLET, EXTENDED RELEASE ORAL ONCE
Status: COMPLETED | OUTPATIENT
Start: 2017-01-01 | End: 2017-01-01

## 2017-01-01 RX ORDER — SODIUM CHLORIDE, SODIUM LACTATE, POTASSIUM CHLORIDE, CALCIUM CHLORIDE 600; 310; 30; 20 MG/100ML; MG/100ML; MG/100ML; MG/100ML
INJECTION, SOLUTION INTRAVENOUS CONTINUOUS
Status: CANCELLED | OUTPATIENT
Start: 2017-01-01

## 2017-01-01 RX ORDER — HYDROMORPHONE HYDROCHLORIDE 1 MG/ML
0.4 INJECTION, SOLUTION INTRAMUSCULAR; INTRAVENOUS; SUBCUTANEOUS EVERY 5 MIN PRN
Status: DISCONTINUED | OUTPATIENT
Start: 2017-01-01 | End: 2017-01-01

## 2017-01-01 RX ORDER — AMOXICILLIN 500 MG/1
500 TABLET, FILM COATED ORAL 2 TIMES DAILY
Qty: 24 TABLET | Refills: 0 | Status: SHIPPED | OUTPATIENT
Start: 2017-01-01 | End: 2017-01-01

## 2017-01-01 RX ORDER — LEVOCETIRIZINE DIHYDROCHLORIDE 5 MG/1
5 TABLET, FILM COATED ORAL EVERY EVENING
COMMUNITY
End: 2018-01-01

## 2017-01-01 RX ORDER — HYDRALAZINE HYDROCHLORIDE 20 MG/ML
10 INJECTION INTRAMUSCULAR; INTRAVENOUS
Status: DISCONTINUED | OUTPATIENT
Start: 2017-01-01 | End: 2017-01-01

## 2017-01-01 RX ORDER — METHOTREXATE 2.5 MG/1
5 TABLET ORAL
Qty: 24 TABLET | Refills: 3 | Status: SHIPPED | OUTPATIENT
Start: 2017-01-01 | End: 2017-01-01

## 2017-01-01 RX ORDER — METHOTREXATE 2.5 MG/1
5 TABLET ORAL
Qty: 80 TABLET | Refills: 0 | Status: CANCELLED | OUTPATIENT
Start: 2017-01-01

## 2017-01-01 RX ORDER — HEPARIN SODIUM 5000 [USP'U]/ML
5000 INJECTION, SOLUTION INTRAVENOUS; SUBCUTANEOUS EVERY 8 HOURS SCHEDULED
Status: DISCONTINUED | OUTPATIENT
Start: 2017-01-01 | End: 2017-01-01

## 2017-01-01 RX ORDER — LEVOTHYROXINE SODIUM 0.07 MG/1
75 TABLET ORAL DAILY
Status: DISCONTINUED | OUTPATIENT
Start: 2017-01-01 | End: 2017-01-01

## 2017-01-01 RX ORDER — DEXTROSE MONOHYDRATE 25 G/50ML
50 INJECTION, SOLUTION INTRAVENOUS
Status: DISCONTINUED | OUTPATIENT
Start: 2017-01-01 | End: 2017-01-01

## 2017-01-01 RX ORDER — ACETAMINOPHEN 500 MG
500 TABLET ORAL EVERY 6 HOURS PRN
Status: DISCONTINUED | OUTPATIENT
Start: 2017-01-01 | End: 2017-01-01

## 2017-01-01 RX ORDER — FOLIC ACID 1 MG/1
1 TABLET ORAL DAILY
Qty: 90 TABLET | Refills: 3 | Status: SHIPPED | OUTPATIENT
Start: 2017-01-01 | End: 2018-01-01

## 2017-01-01 RX ORDER — TRAMADOL HYDROCHLORIDE 50 MG/1
50 TABLET ORAL EVERY 8 HOURS PRN
Status: DISCONTINUED | OUTPATIENT
Start: 2017-01-01 | End: 2017-01-01

## 2017-01-01 RX ORDER — POTASSIUM CHLORIDE 750 MG/1
10 TABLET, EXTENDED RELEASE ORAL DAILY
COMMUNITY
End: 2017-01-01

## 2017-01-01 RX ORDER — ACETAMINOPHEN 500 MG
1000 TABLET ORAL ONCE
Status: COMPLETED | OUTPATIENT
Start: 2017-01-01 | End: 2017-01-01

## 2017-01-01 RX ORDER — HYDROCODONE BITARTRATE AND ACETAMINOPHEN 5; 325 MG/1; MG/1
1 TABLET ORAL EVERY 8 HOURS PRN
Qty: 120 TABLET | Refills: 0 | Status: SHIPPED | OUTPATIENT
Start: 2017-01-01 | End: 2017-01-01

## 2017-01-01 RX ORDER — FLUTICASONE PROPIONATE 50 MCG
2 SPRAY, SUSPENSION (ML) NASAL DAILY
COMMUNITY
End: 2018-01-01

## 2017-01-01 RX ORDER — ONDANSETRON 2 MG/ML
4 INJECTION INTRAMUSCULAR; INTRAVENOUS AS NEEDED
Status: DISCONTINUED | OUTPATIENT
Start: 2017-01-01 | End: 2017-01-01

## 2017-01-01 RX ORDER — SODIUM CHLORIDE 9 MG/ML
INJECTION, SOLUTION INTRAVENOUS ONCE
Status: COMPLETED | OUTPATIENT
Start: 2017-01-01 | End: 2017-01-01

## 2017-01-01 RX ORDER — TEMAZEPAM 15 MG/1
15 CAPSULE ORAL NIGHTLY PRN
Status: DISCONTINUED | OUTPATIENT
Start: 2017-01-01 | End: 2017-01-01

## 2017-01-01 RX ORDER — SODIUM CHLORIDE, SODIUM LACTATE, POTASSIUM CHLORIDE, CALCIUM CHLORIDE 600; 310; 30; 20 MG/100ML; MG/100ML; MG/100ML; MG/100ML
INJECTION, SOLUTION INTRAVENOUS CONTINUOUS
Status: DISCONTINUED | OUTPATIENT
Start: 2017-01-01 | End: 2017-01-01

## 2017-01-01 RX ORDER — TEMAZEPAM 15 MG/1
15 CAPSULE ORAL NIGHTLY PRN
Qty: 90 CAPSULE | Refills: 0 | Status: CANCELLED | OUTPATIENT
Start: 2017-01-01

## 2017-01-01 RX ORDER — HYDROCODONE BITARTRATE AND ACETAMINOPHEN 5; 325 MG/1; MG/1
1 TABLET ORAL EVERY 8 HOURS PRN
Qty: 60 TABLET | Refills: 0 | Status: SHIPPED | OUTPATIENT
Start: 2017-01-01 | End: 2017-01-01

## 2017-01-01 RX ORDER — ACETAMINOPHEN AND CODEINE PHOSPHATE 300; 30 MG/1; MG/1
1 TABLET ORAL AS NEEDED
Status: DISCONTINUED | OUTPATIENT
Start: 2017-01-01 | End: 2017-01-01

## 2017-01-01 RX ORDER — CYANOCOBALAMIN 1000 UG/ML
1000 INJECTION INTRAMUSCULAR; SUBCUTANEOUS ONCE
Status: COMPLETED | OUTPATIENT
Start: 2017-01-01 | End: 2017-01-01

## 2017-01-01 RX ORDER — HYDRALAZINE HYDROCHLORIDE 20 MG/ML
5 INJECTION INTRAMUSCULAR; INTRAVENOUS ONCE
Status: COMPLETED | OUTPATIENT
Start: 2017-01-01 | End: 2017-01-01

## 2017-01-01 RX ORDER — SULFAMETHOXAZOLE AND TRIMETHOPRIM 800; 160 MG/1; MG/1
1 TABLET ORAL 2 TIMES DAILY
COMMUNITY
Start: 2017-01-01 | End: 2017-01-01

## 2017-01-01 RX ORDER — ACETAMINOPHEN 650 MG/1
650 SUPPOSITORY RECTAL ONCE
Status: DISCONTINUED | OUTPATIENT
Start: 2017-01-01 | End: 2017-01-01

## 2017-01-01 RX ORDER — AMOXICILLIN 500 MG
1200 CAPSULE ORAL DAILY
Status: DISCONTINUED | OUTPATIENT
Start: 2017-01-01 | End: 2017-01-01

## 2017-01-01 RX ORDER — TEMAZEPAM 15 MG/1
15 CAPSULE ORAL NIGHTLY PRN
Qty: 90 CAPSULE | Refills: 2 | Status: SHIPPED | OUTPATIENT
Start: 2017-01-01 | End: 2017-01-01

## 2017-01-01 RX ORDER — FOLIC ACID 1 MG/1
1 TABLET ORAL DAILY
Status: DISCONTINUED | OUTPATIENT
Start: 2017-01-01 | End: 2017-01-01

## 2017-01-01 RX ORDER — TRAMADOL HYDROCHLORIDE 50 MG/1
50 TABLET ORAL EVERY 8 HOURS PRN
Qty: 30 TABLET | Refills: 1 | Status: CANCELLED | OUTPATIENT
Start: 2017-01-01

## 2017-01-01 RX ORDER — ALFUZOSIN HYDROCHLORIDE 10 MG/1
10 TABLET, EXTENDED RELEASE ORAL
Status: DISCONTINUED | OUTPATIENT
Start: 2017-01-01 | End: 2017-01-01

## 2017-01-01 RX ORDER — SODIUM CHLORIDE 9 MG/ML
INJECTION, SOLUTION INTRAVENOUS CONTINUOUS
Status: ACTIVE | OUTPATIENT
Start: 2017-01-01 | End: 2017-01-01

## 2017-01-01 RX ORDER — DEXAMETHASONE SODIUM PHOSPHATE 4 MG/ML
10 VIAL (ML) INJECTION ONCE
Status: DISCONTINUED | OUTPATIENT
Start: 2017-01-01 | End: 2017-01-01

## 2017-01-01 RX ORDER — ACETAMINOPHEN AND CODEINE PHOSPHATE 300; 30 MG/1; MG/1
2 TABLET ORAL AS NEEDED
Status: DISCONTINUED | OUTPATIENT
Start: 2017-01-01 | End: 2017-01-01

## 2017-01-01 RX ORDER — MELATONIN
325
COMMUNITY
End: 2018-01-01

## 2017-01-01 RX ORDER — NALOXONE HYDROCHLORIDE 0.4 MG/ML
80 INJECTION, SOLUTION INTRAMUSCULAR; INTRAVENOUS; SUBCUTANEOUS AS NEEDED
Status: DISCONTINUED | OUTPATIENT
Start: 2017-01-01 | End: 2017-01-01

## 2017-01-01 RX ORDER — LEVOTHYROXINE SODIUM 0.07 MG/1
75 TABLET ORAL
Status: DISCONTINUED | OUTPATIENT
Start: 2017-01-01 | End: 2017-01-01

## 2017-01-01 RX ORDER — FLUTICASONE PROPIONATE 50 MCG
2 SPRAY, SUSPENSION (ML) NASAL
Status: DISCONTINUED | OUTPATIENT
Start: 2017-01-01 | End: 2017-01-01

## 2017-01-01 RX ORDER — ONDANSETRON 2 MG/ML
4 INJECTION INTRAMUSCULAR; INTRAVENOUS EVERY 6 HOURS PRN
Status: DISCONTINUED | OUTPATIENT
Start: 2017-01-01 | End: 2017-01-01

## 2017-01-01 RX ORDER — TEMAZEPAM 15 MG/1
15 CAPSULE ORAL NIGHTLY PRN
COMMUNITY
End: 2018-01-01

## 2017-01-01 RX ORDER — TRAMADOL HYDROCHLORIDE 50 MG/1
50 TABLET ORAL EVERY 12 HOURS PRN
Status: DISCONTINUED | OUTPATIENT
Start: 2017-01-01 | End: 2017-01-01

## 2017-01-01 RX ORDER — MIDAZOLAM HYDROCHLORIDE 1 MG/ML
1 INJECTION INTRAMUSCULAR; INTRAVENOUS EVERY 5 MIN PRN
Status: DISCONTINUED | OUTPATIENT
Start: 2017-01-01 | End: 2017-01-01

## 2017-01-01 RX ORDER — ACETAMINOPHEN 325 MG/1
650 TABLET ORAL EVERY 6 HOURS PRN
Status: DISCONTINUED | OUTPATIENT
Start: 2017-01-01 | End: 2017-01-01

## 2017-01-01 RX ORDER — HYDRALAZINE HYDROCHLORIDE 20 MG/ML
10 INJECTION INTRAMUSCULAR; INTRAVENOUS EVERY 6 HOURS PRN
Status: DISCONTINUED | OUTPATIENT
Start: 2017-01-01 | End: 2017-01-01

## 2017-01-01 RX ORDER — DEXTROSE MONOHYDRATE 25 G/50ML
50 INJECTION, SOLUTION INTRAVENOUS
Status: CANCELLED | OUTPATIENT
Start: 2017-01-01

## 2017-01-01 RX ORDER — FAMOTIDINE 20 MG/1
40 TABLET ORAL DAILY
Status: DISCONTINUED | OUTPATIENT
Start: 2017-01-01 | End: 2017-01-01

## 2017-01-01 RX ORDER — BUPIVACAINE HYDROCHLORIDE 5 MG/ML
INJECTION, SOLUTION EPIDURAL; INTRACAUDAL AS NEEDED
Status: DISCONTINUED | OUTPATIENT
Start: 2017-01-01 | End: 2017-01-01

## 2017-01-01 RX ORDER — HYDRALAZINE HYDROCHLORIDE 20 MG/ML
INJECTION INTRAMUSCULAR; INTRAVENOUS
Status: COMPLETED
Start: 2017-01-01 | End: 2017-01-01

## 2017-02-10 PROBLEM — L40.9 PSORIASIS: Status: ACTIVE | Noted: 2017-01-01

## 2017-02-10 NOTE — PATIENT INSTRUCTIONS
Current instructions are to take methotrexate 1 tablet a week to treat the psoriasis and psoriatic arthritis.   If there is problems with psoriasis and psoriatic arthritis she can even take the methotrexate at a higher dose the maximum would be 6 tablets pe

## 2017-02-10 NOTE — PROGRESS NOTES
[facility]  Dr. Chiquita Neil Progress Note     Subjective:   Chidi Gonzalez is a(n) 80year old male. Current complaints: Patient presents with:  Joint Pain: pt is here for a 4 month f/up. pt c/o nothing today. he state's he's doing well.   Joint pain ordered to be used on the elbow for skin rash.     Lucy Leos MD 6/96/7190 2:37 PM

## 2017-02-14 PROBLEM — Z79.899 ENCOUNTER FOR LONG-TERM (CURRENT) USE OF MEDICATIONS: Status: ACTIVE | Noted: 2017-01-01

## 2017-02-14 PROBLEM — Z98.890 S/P COLONOSCOPY: Status: ACTIVE | Noted: 2017-01-01

## 2017-02-16 PROBLEM — E11.42 PERIPHERAL SENSORY NEUROPATHY DUE TO TYPE 2 DIABETES MELLITUS (HCC): Status: ACTIVE | Noted: 2017-01-01

## 2017-02-16 PROBLEM — Z98.890 S/P COLONOSCOPY: Status: ACTIVE | Noted: 2017-01-01

## 2017-02-16 PROBLEM — Z79.899 ENCOUNTER FOR LONG-TERM (CURRENT) USE OF MEDICATIONS: Status: ACTIVE | Noted: 2017-01-01

## 2017-03-21 NOTE — OPERATIVE REPORT
[facility]    PATIENT'S NAME: Bertin Elizabeth   ATTENDING PHYSICIAN: Sarkis Krishnamurthy M.D. OPERATING PHYSICIAN: Sarkis Krishnamurthy M.D.    PATIENT ACCOUNT#:   [de-identified]    LOCATION:  [place] [address] ED 10  MEDICAL RECORD #:   [id number]       DATE anesthetic, incision was made and the ilioinguinal nerve was identified. It was medially rotated. The spermatic cord was isolated. There was a loose floor that was identified.   It was carefully dissected and pushed everything back into the preperitoneal

## 2017-03-21 NOTE — ANESTHESIA PREPROCEDURE EVALUATION
PRE-OP EVALUATION    Patient Name: Yoseph Ortiz    Pre-op Diagnosis: RIGHT INGUINAL HERNIA      Procedure(s):  OPEN RIGHT INGUINAL HERNIA REPAIR WITH MESH      Surgeon(s) and Role:     Deena Schlatter, MD - Primary    Pre-op vitals reviewed.   Temp: 9 (VITAMIN D) 1000 UNITS Oral Tab Take 1,000 Units by mouth daily.  Disp:  Rfl:    ACCU-CHEK FASTCLIX LANCETS Does not apply Misc SMBG BID every other day = 30 strips a month; DX 25000-not on insulin Disp: 102 each Rfl: 3   Blood Glucose Monitoring Suppl (ACC 03/14/2017   RBC 2.71* 03/14/2017   HGB 9.6* 03/14/2017   HCT 29.6* 03/14/2017   .2* 03/14/2017   MCH 35.4* 03/14/2017   MCHC 32.4 03/14/2017   RDW 14.8 03/14/2017    03/14/2017       Lab Results  Component Value Date    03/14/2017   K

## 2017-03-21 NOTE — ANESTHESIA POSTPROCEDURE EVALUATION
[address] Patient Status:  Hospital Outpatient Surgery   Age/Gender 80year old male MRN [id number]   Location [address] [place] [address] Attending Yonatan Butler MD   Hosp Day # 0 PCP Matias Hennessy MD

## 2017-03-21 NOTE — HISTORICAL OFFICE NOTE
The above referenced H&P 3/13/2017 was reviewed by Romulo Daugherty MD on 3/21/2017, the patient was examined and no significant changes have occurred in the patient's condition since the H&P was performed.   Risks and benefits were discussed, proceed with pr

## 2017-03-21 NOTE — BRIEF OP NOTE
[facility] SURGERY  Brief Op Note     Joane Hashimoto Location: OR   [facility] [id number] MRN [id number]   Admission Date 3/21/2017 Operation Date 3/21/2017   Attending Physician Estiven Cruz MD Operating Physician Thomas Chung MD       Pre-Operative D

## 2017-06-13 NOTE — PROGRESS NOTES
[facility]  Dr. Иван Johnson Progress Note     Subjective:   Lanny Maradiaga is a(n) 80year old male. Current complaints: Patient presents with:  Psoriatic Arthritis: 4 month f/u.  Pt with bilateral hip pain-uses tramadol with good results noted, but d of tramadol interfere with his balance. Another option is to try Voltaren gel which can be used up to 4 times a day on the sore joints. This is a prescription gel.   Over-the-counter gels that  sometimes help arthritis are Icy hot, Thera-Gesic cream, or c

## 2017-06-13 NOTE — PATIENT INSTRUCTIONS
Plan is to continue on methotrexate 2 tablets a week to treat the psoriasis and psoriatic arthritis. Unfortunately because of renal disease we cannot use more.   Also because of the renal insufficiency we cannot take aspirin, ibuprofen or Aleve as it would

## 2017-09-10 PROBLEM — R55 SYNCOPE AND COLLAPSE: Status: ACTIVE | Noted: 2017-01-01

## 2017-09-10 PROBLEM — R53.1 WEAKNESS GENERALIZED: Status: ACTIVE | Noted: 2017-01-01

## 2017-09-10 NOTE — PLAN OF CARE
Problem: NEUROLOGICAL - ADULT  Goal: Achieves stable or improved neurological status  INTERVENTIONS  - Assess for and report changes in neurological status  - Initiate measures to prevent increased intracranial pressure  - Maintain blood pressure and fluid appropriate  Outcome: Progressing      Problem: MUSCULOSKELETAL - ADULT  Goal: Return mobility to safest level of function  INTERVENTIONS:  - Assess patient stability and activity tolerance for standing, transferring and ambulating w/ or w/o assistive riley

## 2017-09-10 NOTE — ED PROVIDER NOTES
Patient Seen in: [facility] Emergency Department    History   Patient presents with:  Altered Mental Status (neurologic)  Abdomen/Flank Pain (GI/)    Stated Complaint: abd pain, AMS    HPI    70-year-old male brought in by family for weakness and Never Used                      Alcohol use: No                Review of Systems    Positive for stated complaint: abd pain, AMS  Other systems are as noted in HPI. Constitutional and vital signs reviewed.       All other systems reviewed and negative exce DIFFERENTIAL - Abnormal; Notable for the following:     RBC 2.21 (*)     HGB 8.1 (*)     HCT 24.7 (*)     .8 (*)     MCH 36.7 (*)     RDW-SD 64.2 (*)     Lymphocyte Absolute 0.85 (*)     All other components within normal limits   LIPASE - Normal

## 2017-09-10 NOTE — PROGRESS NOTES
Pharmacy Note: Renal dose adjustment for Tramadol (Ultram)  Ghanshyam Orlando has been prescribed Tramadol (Ultram)  50 mg orally every 8 hours as needed for pain. Estimated Creatinine Clearance: 18.3 mL/min (based on SCr of 2.33 mg/dL).     His calculate

## 2017-09-10 NOTE — ED INITIAL ASSESSMENT (HPI)
EMS called for abdomen pain and AMS. Pt was complaining of abdomen pain immediately after dinner. Abdomen pain lasted about 5 minutes. Pt was in and out of consciusness per family so EMS was called.  Pt. A&Ox 4 on EMS arrival and ER arrival. States he cant

## 2017-09-10 NOTE — H&P
[facility] Hospitalist History and Physical      CC: Weakness, fall/LOC    PCP: Shamika Maldonado MD    History of Present Illness: Patient is a 80year old male with PMH sig for carcinoid tumor of the stomach, pernicious anemia, BPH, hx of HTN, hx of DM, hypothyroi Tab Take 2 tablets (5 mg total) by mouth every 7 days. (Patient taking differently: Take 5 mg by mouth.  Two times a week every Tuesdays and saturdays ) Disp: 24 tablet Rfl: 3   temazepam 15 MG Oral Cap Take 1 capsule (15 mg total) by mouth nightly as neede Solution Inject 1 mL as directed every 30 (thirty) days. (Patient taking differently: Inject 1,000 mcg as directed every 30 (thirty) days.  Every 15th of the month ) Disp: 1 mL Rfl: 12   Diclofenac Sodium (VOLTAREN) 1 % Transdermal Gel Apply 4 g topically 4 hard of hearing      Data Review:    Labs:     Lab Results  Component Value Date   WBC 7.0 09/10/2017   HGB 7.4 09/10/2017   HCT 22.3 09/10/2017   .0 09/10/2017   CREATSERUM 1.90 09/10/2017   BUN 50 09/10/2017    09/10/2017   K 4.3 09/10/2017 dilutional    Hypothyroidism  - Check TSH    DM  - Hold oral, SSI, BS have been OK    Psoriasis  - On MTX as outpatient, OK to continue if still here on Tuesday    CKD  - Creat looks better than prior  - Monitor    Pernicious Anemia  - Follows with heme- m

## 2017-09-10 NOTE — PLAN OF CARE
Patient is here due to a syncopal episode at home. BP was found to be in the 70s. PMH: Skin and stomach CA, anemia, DM, GERD, HTN, hypothyroidism, falls. Patient is alert and oriented. [place], bilateral hearing aids. CT was unremarkable.  Saturates well on ro

## 2017-09-10 NOTE — PROGRESS NOTES
09/10/17 0844 09/10/17 0846 09/10/17 0848   Vital Signs   Temp 98.2 °F (36.8 °C) --  --    Temp src Oral --  --    Pulse 58 59 64   Resp 20 --  --    BP (!) 149/105 155/78 149/77   BP Location Right arm Right arm Right arm   BP Method Automatic Automati

## 2017-09-10 NOTE — SIGNIFICANT EVENT
Received patient from ED. He alert and oriented but hard of hearing. He was oriented to room setup. Needs attended.

## 2017-09-11 NOTE — PLAN OF CARE
CARDIOVASCULAR - ADULT    • Maintains optimal cardiac output and hemodynamic stability Progressing        Diabetes/Glucose Control    • Glucose maintained within prescribed range Progressing        METABOLIC/FLUID AND ELECTROLYTES - ADULT    • Glucose main

## 2017-09-11 NOTE — PLAN OF CARE
Pt discharged home via wheelchair accompanied by family members. Discharge instructions given with all verbalizing understanding. He will follow up with Dr. Dimitri Burris on Monday.

## 2017-09-11 NOTE — DISCHARGE SUMMARY
General Medicine Discharge Summary     Patient ID:  Sydney Flores  80year old  7/28/1925    Admit date: 9/9/2017    Discharge date and time:  9/11/17    Attending Physician: No att. providers found regimen     Psoriasis  - On MTX as outpatient, OK to continue    Pernicious Anemia  - Follows with heme- monitor in house  - He is anemic though chronically so- Hgb ~ 7 on admit- now up to 8 w/o intervention which is around his baseline  - Continue outpati as needed for Sleep., Normal, Disp-90 capsule, R-2    folic acid 1 MG Oral Tab  Take 1 tablet (1 mg total) by mouth daily. , Normal, Disp-90 tablet, R-3    GLIMEPIRIDE 1 MG Oral Tab  TAKE ONE TABLET BY MOUTH ONCE DAILY WITH  BREAKFAST, Normal, Disp-60 table discharge:      09/11/17  0845   BP:    Pulse:    Resp: 20   Temp: 98.3 °F (36.8 °C)       General: no acute distress, alert and oriented x 3  Heart: RRR  Lungs: clear bilaterally, no active wheezing  Abdomen: nontender, nondistended, intact BS  Extremitie

## 2017-09-11 NOTE — OCCUPATIONAL THERAPY NOTE
Attempted to see pt for skilled OT services this date. Pt is currently soundly sleeping. Will re-attempt as schedule allows.  Jorge A Plasencia, 09/11/17, 2:36 PM

## 2017-09-11 NOTE — PHYSICAL THERAPY NOTE
PHYSICAL THERAPY QUICK EVALUATION - INPATIENT    Room Number: 5760/4463-A  Evaluation Date: 9/11/2017  Presenting Problem: Syncope and collapse  Physician Order: PT Eval and Treat    Problem List  Principal Problem:    Syncope and collapse  Active Problems are within functional limits     Lower extremity ROM is within functional limits     Lower extremity strength is within functional limits     ACTIVITY TOLERANCE  Room air  No shortness of breath  Heart Rate: at rest 105 bpm and with activity 110-115 bpm within reach;RN aware of session/findings; All patient questions and concerns addressed    ASSESSMENT   Patient is a 80year old male admitted on 9/9/2017 for syncope and collapse.    Pertinent comorbidities and personal factors impacting therapy include hea

## 2017-09-13 PROBLEM — A41.9 SEPSIS, DUE TO UNSPECIFIED ORGANISM: Status: ACTIVE | Noted: 2017-01-01

## 2017-09-13 PROBLEM — N30.01 ACUTE CYSTITIS WITH HEMATURIA: Status: ACTIVE | Noted: 2017-01-01

## 2017-09-13 NOTE — PROGRESS NOTES
[facility] Pharmacy Note:  Renal Adjustment for Zosyn (piperacillin/tazobactam)    Keenan Newell is a 80year old male who has been prescribed Zosyn (piperacillin/tazobactam) 3.375 g IVPB every 8 hrs. CrCl is estimated to be 18.2 mL/min (based on a SCr of 2.

## 2017-09-13 NOTE — CONSULTS
[facility] Pharmacy Note:  Dose Adjustment for Vancocin (vancomycin)    Fredrick Terry is a 80year old male who has been prescribed Vancocin (vancomycin) 1000 mg X 1 dose.   CrCl is estimated creatinine clearance is 18.1 mL/min (based on SCr of 2.35 mg/dL (H))

## 2017-09-13 NOTE — CONSULTS
Pulmonary / Critical Care H&P/Consult       NAME: Colton Newell: 362/039-O - MRN: [id number] - Age: 80year old - :  1925    Date of Admission: 2017 10:19 AM  Admission Diagnosis: Acute cystitis with hematuria [N30.01]  Sepsis, due t Social History Narrative   None on file          Family History:  Family History   Problem Relation Age of Onset   • Heart Disorder Sister    • Heart Disorder Brother         Home Medications:    No outpatient prescriptions have been marked as taking f normal, no murmur, rub   or gallop   Abdomen:     Soft, non-tender, bowel sounds active all four quadrants,     no masses, no organomegaly   Extremities:   Extremities normal, atraumatic, no cyanosis or edema   Pulses:   2+ and symmetric all extremities

## 2017-09-13 NOTE — PROGRESS NOTES
[facility] Pharmacy Note:  Renal Dose Adjustment    Faviola Wiggins has been prescribed famotidine (PEPCID) 20 mg orally every 12 hours.         His calculated creatinine clearance is <50 ml/min, therefore the dose of famotidine (PEPCID) has been changed to 20 m

## 2017-09-13 NOTE — SEPSIS REASSESSMENT
[facility]    Sepsis Reassessment Note    BP (!) 88/53   Pulse 85   Temp 101 °F (38.3 °C) (Temporal)   Resp 15   SpO2 95%      12:39 PM    Cardiac:  Regularity: Regular  Rate: Normal  Heart Sounds: S1,S2    Lungs:   Right: Diminished  Left: Clear

## 2017-09-13 NOTE — ED PROVIDER NOTES
Patient Seen in: [facility] Emergency Department    History   Patient presents with:  Fever (infectious)    Stated Complaint: fever weakness    HPI    59-year-old male here for a fever.     Per the patient's son the patient was found laying on the gr above.    PSFH elements reviewed from today and agreed except as otherwise stated in HPI.     Physical Exam   ED Triage Vitals  BP: 138/77 [09/13/17 1012]  Pulse: 118 [09/13/17 1012]  Resp: 16 [09/13/17 1012]  Temp: (!) 102.6 °F (39.2 °C) [09/13/17 1012]  T INR 1.19 (*)     All other components within normal limits   CBC W/ DIFFERENTIAL - Abnormal; Notable for the following:     WBC 15.5 (*)     RBC 2.28 (*)     HGB 8.4 (*)     HCT 24.8 (*)     .0 (*)     .8 (*)     MCH 36.8 (*)     RDW-SD 62.4 the emergency department for a little bit he will be admitted primarily to the [facility] hospitalist.      Patient's blood pressure did drop into the 12Z systolic. We will admit him to the ICU. [facility] pulmonology has been consulted.     I put an order for Levoph

## 2017-09-13 NOTE — H&P
[facility] Hospitalist H&P       CC: Patient presents with:  Fever (infectious)       PCP: Lindaann Riedel, MD    History of Present Illness: Patient is a 80year old male with PMH sig for  Carcinoid, HTN, hyothyroidism, DM2 and LESLIE is admitted with complaint of prescriptions have been marked as taking for [facility]).       Soc Hx     Smoking status: Never Smoker    Smokeless tobacco: Never Used    Alcohol use No        Fam Hx  Family History   Problem Relation Age of Onset   • Heart input(s): TROP in the last 72 hours.     Additional Diagnostics: ECG: ST with PVCs    CXR: image personally reviewed normal    Radiology: Ct Brain Or Head ([id number])    Result Date: 9/13/2017  PROCEDURE:  CT BRAIN OR HEAD ([id number])  COMPARISON:  THONY KOENIG atrophy. INTRACRANIAL:  Periventricular low-attenuation consistent with small vessel ischemic disease is moderate in degree. There are no abnormal extraaxial fluid collections. There is no midline shift. There are no intraparenchymal brain abnormalities. Tortuous thoracic aorta with calcified plaque. Cardiomegaly with normal pulmonary vascularity. No pleural effusion or pneumothorax. Mild scarring/atelectasis in the mid to lower lungs. CONCLUSION:  No lobar pneumonia or overt congestive failure.  Mediu

## 2017-09-13 NOTE — PROGRESS NOTES
[facility] Pharmacy Note:  Renal Dose Adjustment    Vesna Ramirez has been prescribed famotidine (PEPCID) 20 mg orally every 12 hours.         His calculated creatinine clearance is <50 ml/min, therefore the dose of famotidine (PEPCID) has been changed to 20 m

## 2017-09-14 NOTE — PROGRESS NOTES
Patient transferred from ICU. Alert and orientated. [place] with hearing aids. VSS, SB on tele, room air. Denies pain or discomfort. Will continue to monitor.

## 2017-09-14 NOTE — PLAN OF CARE
CARDIOVASCULAR - ADULT    • Maintains optimal cardiac output and hemodynamic stability Progressing        GENITOURINARY - ADULT    • Absence of urinary retention Progressing        HEMATOLOGIC - ADULT    • Maintains hematologic stability Progressing

## 2017-09-14 NOTE — PHYSICAL THERAPY NOTE
PHYSICAL THERAPY EVALUATION - INPATIENT     Room Number: 0359/6982-F  Evaluation Date: 9/14/2017  Type of Evaluation: Initial  Physician Order: PT Eval and Treat    Presenting Problem: Acute cystitis w/ hematuria, Sepsis  Reason for Therapy: Mobility D Enter : 1  Railing: No  Stairs to Bedroom: 14  Railing: Yes    Lives With: Spouse;Son (Son cares for both parents)  Drives: No  Patient Owned Equipment: Rolling walker; Other (Comment) (Quad cane, straight cane, hospital bed, commode)  Patient Regularly Use bed (including adjusting bedclothes, sheets and blankets)?: A Little   -   Sitting down on and standing up from a chair with arms (e.g., wheelchair, bedside commode, etc.): A Lot   -   Moving from lying on back to sitting on the side of the bed?: A Little therapy include dm, recent syncope w/ fall, htn, pernicious anemia. In this PT evaluation, the patient presents with the following impairments decreased balance, decreased le strength, limited cardiopulmonary endurance.   Functional outcome measures comple

## 2017-09-14 NOTE — PROGRESS NOTES
Hgb 6.6 from 8.4 yesterday. One unit PRBC ordered to transfuse as per [facility] plan to transfuse for Hgb <7.  RN to place order for repeat Hgb 2h post transfusion.     HORACE Tamayo  Critical Care NP  [place] [phone]  Pgr: [phone]

## 2017-09-14 NOTE — PROGRESS NOTES
[facility] Hospitalist Progress Note     PCP: Aubrey Amador MD    SUBJECTIVE:  No CP, SOB, or N/V. Pt is feeling better today. Also has hearing aide so better able to hear/converse.     OBJECTIVE:  Temp:  [98.4 °F (36.9 °C)-101 °F (38.3 °C)] 98.8 °F (37.1 °C) Daily   • Levothyroxine Sodium  75 mcg Oral Before breakfast   • famoTIDine  20 mg Oral Daily   • Pravastatin Sodium  20 mg Oral Nightly   • Insulin Aspart Pen  1-5 Units Subcutaneous TID CC and HS     • sodium chloride 150 mL/hr at 09/14/17 0358     jamia

## 2017-09-14 NOTE — PLAN OF CARE
NURSING ADMISSION NOTE      Patient admitted via Cart  Oriented to room. Safety precautions initiated. Bed in low position. Call light in reach. Received patient from ED at 1420. Patient lethargic, extremely [place].  Patient with borderline hypotensi

## 2017-09-14 NOTE — OCCUPATIONAL THERAPY NOTE
OCCUPATIONAL THERAPY EVALUATION - INPATIENT     Room Number: 0226/9059-O  Evaluation Date: 9/14/2017  Type of Evaluation: Initial  Presenting Problem: Acute cystitis w/ hematuria; sepsis    Physician Order: IP Consult to Occupational Therapy  Reason for Th House  Home Layout: Two level  Lives With: Spouse;Son (Son cares for both parents)    Toilet and Equipment: Comfort height toilet;3-in-1 commode  Shower/Tub and Equipment: Tub-shower combo  Other Equipment: Other (Comment) (cane, quad cane; RW)    Occupati digits     ADDITIONAL TESTS  Other Test(s): MBI: 8            NEUROLOGICAL FINDINGS  Neurological Findings: None          ACTIVITY TOLERANCE   O2 Saturation: 94%  Room air  No shortness of breath  Heart Rate: 61  Blood Pressure: Supine 118/68 and Sitting 1 is demonstrating a  47% degree impairment in activities of daily living. Research supports that patients with this level of impairment often benefit from [facility].      Modified Barthel Index score of 8/20; <15 usually indicates moderate disability; <10 usually training;Equipment eval/education; Fine motor coordination activities; Compensatory technique education  Rehab Potential : Good  Frequency (Obs): 5x/week  Number of Visits to Meet Established Goals: 7    ADL Goals   Patient will perform all ADLs: with superv

## 2017-09-14 NOTE — PLAN OF CARE
Remains stable off levo. Weaned to RA. hgb 6.6 this AM. Receiving 1 unit prbc's. Zosyn continued. Plan to repeat hgb post transfusion. Plan to tx to floor today. Report called to [facility] [place]. Pt transferred w/ all belongings and BS meds.        HEMATOLOGIC - AD

## 2017-09-14 NOTE — PROGRESS NOTES
[address] Patient Status:  Inpatient    1925 MRN [id number]   [facility] 4SW-A Attending Juve Maldonado, DO   Hosp Day # 1 PCP Clare Bourne MD     Critical Care Progress Note     Date of Admission: 2017 tablet, 4 tablet, Oral, Q15 Min PRN **OR** dextrose 50% injection 50 mL, 50 mL, Intravenous, Q15 Min PRN **OR** glucose (DEX4) oral liquid 30 g, 30 g, Oral, Q15 Min PRN **OR** Glucose-Vitamin C (DEX-4) 4-0.006 g chewable tab 8 tablet, 8 tablet, Oral, Q15 M 33.0   RDW  15.6  15.9   NEPRELIM  14.82*  11.84*   WBC  15.5*  13.1*   PLT  148.0*  123.0*       Recent Labs   Lab  09/13/17   1251   INR  1.19*   PTT  27.3       Imaging: I have independently visualized all relevant chest imaging in PACS.   I agree with t

## 2017-09-15 NOTE — OCCUPATIONAL THERAPY NOTE
Attempted to see pt for OT. Pt with elevated BP (!) 188/74 . Discussed with RN. Will hold for now, re-attempt to see pt as appropriate.

## 2017-09-15 NOTE — CM/SW NOTE
SW found pt thru casefinding for readmission risk and met with pt for assessment and d/c planning. Pt is a 80 y.o male admitted on 09/13 with dx acute cystitis with hematuria. Pt's most recent admission was 09/09-09/11 with no d/c needs.  Pt is A&O and [facility]

## 2017-09-15 NOTE — OCCUPATIONAL THERAPY NOTE
OCCUPATIONAL THERAPY TREATMENT NOTE - INPATIENT     Room Number: 2283/1775-I  Session: 1   Number of Visits to Meet Established Goals: 7    Presenting Problem: Acute cystitis w/ hematuria; sepsis    History related to current admission: Pt is 80year old m Bearing Restriction: None                PAIN ASSESSMENT  Ratin  Location: none reported  Management Techniques: Repositioning     ACTIVITY TOLERANCE  Room air  Blood Pressure: 171/79    ACTIVITIES OF DAILY LIVING ASSESSMENT  AM-PAC ‘6-Clicks’ Inpatien PT/OT  OT Device Recommendations: Reacher;Sock aid;Long-handled shoehorn;Transfer tub bench    PLAN  OT Treatment Plan: Balance activities; Energy conservation/work simplification techniques;ADL training;Functional transfer training;UE strengthening/ROM;End

## 2017-09-15 NOTE — PLAN OF CARE
HEMATOLOGIC - ADULT    • Maintains hematologic stability Progressing        Impaired Activities of Daily Living    • Achieve highest/safest level of independence in self care Progressing        Impaired Functional Mobility    • Achieve highest/safest level

## 2017-09-15 NOTE — PROGRESS NOTES
[facility] Hospitalist Progress Note     PCP: Blaise Gutierres MD    SUBJECTIVE:  No CP, SOB, or N/V.    OBJECTIVE:  Temp:  [97.5 °F (36.4 °C)-98.6 °F (37 °C)] 97.5 °F (36.4 °C)  Pulse:  [51-64] 55  Resp:  [16-20] 16  BP: (125-188)/(72-94) 188/74    Intake/Output g Intravenous Q12H   • cholecalciferol  1,000 Units Oral Daily   • folic acid  1 mg Oral Daily   • Levothyroxine Sodium  75 mcg Oral Before breakfast   • famoTIDine  20 mg Oral Daily   • Pravastatin Sodium  20 mg Oral Nightly   • Insulin Aspart Pen  1-5 Un

## 2017-09-15 NOTE — PHYSICAL THERAPY NOTE
PHYSICAL THERAPY TREATMENT NOTE - INPATIENT    Room Number: 1182/2277-U     Session: 1/5   Number of Visits to Meet Established Goals: 5    Presenting Problem: Acute cystitis w/ hematuria, Sepsis     History related to current admission: Pt is 80year old BEARING RESTRICTION  Weight Bearing Restriction: None                PAIN ASSESSMENT   Ratin  Location: denies pn       BALANCE                                                                                                                     Static S EXERCISES  Lower Extremity Ankle pumps  Hip adduction squeezes  Knee extension     Upper Extremity see OT note     Position Sitting     Repetitions   10   Sets   1     Patient End of Session: Up in chair;Needs met;RN aware of session/findings;Call light wi

## 2017-09-15 NOTE — CONSULTS
INFECTIOUS DISEASE CONSULT NOTE    Ankurelsa Martínez Patient Status:  Inpatient    1925 MRN [id number]   [facility] 3NE-A Attending David Jiang, [address] [facility] Day # 2 PCP Nisreen Collado HYPOTHYROIDISM    • Inguinal Hernia RT 9/14/2010   • Neuropathy (HCC)    • Osteoarthritis     generalized, and psoriatic   • PSA (psoriatic arthritis) ([facility] [address] 9/14/2010   • Sleep apnea    • Type II or unspecified type diabetes mellitus without mention of com 100 UNIT/ML flexpen 1-5 Units, 1-5 Units, Subcutaneous, TID CC and HS    Review of Systems:    Completed. See pertinent positives and negatives in the the HPI. Physical Exam:    General: No acute distress. Alert and oriented x 3.  Hard of hearing  Vital Small*   WBCUR  21-50*   RBCUR  >10*   BACUR  1+*   EPIUR  None Seen       Microbiology    Reviewed in EMR,  Blood Culture FREQ X 2 [[id number]] (Abnormal) Collected: 09/13/17 1035   Order Status: Completed Lab Status: Preliminary result Updated: 09/15/17 1 Specimen: Blood from Blood,peripheral        Radiology:    Result Date: 9/13/2017  PROCEDURE:  CT BRAIN OR HEAD (33268)  COMPARISON:  ARYA , CT BRAIN OR HEAD (00025), 9/10/2017, 0:02.   INDICATIONS:  fever weakness  TECHNIQUE:  Noncontrast CT scanning i moderate in degree. There are no abnormal extraaxial fluid collections. There is no midline shift. There are no intraparenchymal brain abnormalities. There  is nothing specific for acute infarct. There is no hemorrhage or mass lesion.   SINUSES: resuscitation, but does report some dark stools, could have a GI bleed with concomitant bacteremia due to translocation from GI site  -he does have pernicious anemia, baseline Hb around 8    PLAN:    -add empiric vanco  -await final ID on GPR and adjust ab

## 2017-09-15 NOTE — PROGRESS NOTES
Oral contrast was started at 18:30. Patient aware that he needs to begin to drink second half of oral contrast at 19:30. Staff will continue to monitor.

## 2017-09-15 NOTE — CONSULTS
[address] [facility] dosing service    Initial Pharmacokinetic Consult for Vancomycin Dosing     Jenni Sales is a 80year old male admitted on 09/13/2017 who is being treated for sepsis.   Pharmacy has been asked to dose Vancomycin by Dr. Yen Baca    He has No K capped at 2g) x 1 dose on 09/13/2017. Will resume Vancomycin with 1 gm IVPB every 36 hours based upon actual weight, renal function, and pharmacokinetics. 2.  Pharmacy ordered Vancomycin trough level(s) prior to 3rd dose.    Goal trough level 15-20 ug/m

## 2017-09-16 NOTE — CONSULTS
[address] [place] [address]  TEL: [phone]  FAX: [phone]    Janice Dupont Patient Status:  Inpatient    1925 MRN [id number]   [facility] 3NE-A Attending Jorge Goss, [address] Day # 3 PCP Carrie Dominguez, 3.1*   --    --    --    NA  138  141  143  142   K  4.0  3.9  3.8  3.6   CL  106  111  114*  112*   CO2  22.0  19.0*  19.0*  23.0   ALKPHO  56   --    --    --    AST  17   --    --    --    ALT  14*   --    --    --    BILT  1.1   --    --    --    TP  6 periaortic measures 2.5 x 2.3 versus 2.3 x 2.5 cm. Bilobed partially calcified node measures 3.4 x 1.9 versus 3.3 x 2.1 cm. ABDOMINAL WALL:  Subcutaneous edema along the lateral abdominal and pelvic soft tissues. PELVIC NODES:  Normal.  No adenopathy.   PE stools, could have a GI bleed with concomitant bacteremia due to translocation from GI site.  However Hb now stable  -he does have pernicious anemia, baseline Hb around 8     PLAN:     -cont empiric vanco and zosyn  -await final ID on GPR and adjust abx  -f

## 2017-09-16 NOTE — PROGRESS NOTES
[facility] Hospitalist Progress Note     PCP: Rusty Montoya MD    SUBJECTIVE:  No CP, SOB, or N/V. Possible loose stools overnight but none this am.       Hg stable. No melena.         OBJECTIVE:  Temp:  [97.5 °F (36.4 °C)-99.3 °F (37.4 °C)] 97.5 °F (36.4 ° 09/15/17   2120  09/16/17   0749   PGLU  125*  181*  141*  168*  120*       Meds:     • vancomycin  15 mg/kg Intravenous Q36H   • acetaminophen  650 mg Rectal Once   • piperacillin-tazobactam  3.375 g Intravenous Q12H   • cholecalciferol  1,000 Units Oral

## 2017-09-17 NOTE — PROGRESS NOTES
pts SBP in 160s-170s. Oral medications prescribed, but pt currently NPO. Dr. Gabi Cardoza called and notified. Stated to monitor pt and will will reassess if continue to elevate and resume po meds when able.

## 2017-09-17 NOTE — PROGRESS NOTES
[facility] Hospitalist Progress Note     PCP: Leeroy Lew MD    SUBJECTIVE:  No CP, SOB, or N/V. No fevers.           OBJECTIVE:  Temp:  [97.6 °F (36.4 °C)-98.8 °F (37.1 °C)] 98.8 °F (37.1 °C)  Pulse:  [65-84] 73  Resp:  [16-20] 20  BP: (110-184)/() mg/kg Intravenous Q36H   • acetaminophen  650 mg Rectal Once   • piperacillin-tazobactam  3.375 g Intravenous Q12H   • cholecalciferol  1,000 Units Oral Daily   • folic acid  1 mg Oral Daily   • Levothyroxine Sodium  75 mcg Oral Before breakfast   • famoTI

## 2017-09-17 NOTE — CONSULTS
[address] [place] [address]  TEL: [phone]  FAX: [phone]    Virginie Jackson Patient Status:  Inpatient    1925 MRN [id number]   [facility] 3NE-A Attending [facility], [address] [place] [address] Day # 4 PCP Alexis Shanks, since no lines or prosthetic devices. Had high fevers on admission with 2/2 +Bcx, assume these are not contaminated Bcx. Another possibility would be anaerobic bacteremia from GI source although does not have pain and CT w/o acute changes. LFTs are nl.  GPR

## 2017-09-18 NOTE — PROGRESS NOTES
[facility] Hospitalist Progress Note     PCP: Gary Talamantes MD    SUBJECTIVE:  No CP, SOB, or N/V. No fevers. Today he says that he feels better.       OBJECTIVE:  Temp:  [97.5 °F (36.4 °C)-99.7 °F (37.6 °C)] 98.6 °F (37 °C)  Pulse:  [] 79  Resp 1,000 Units Oral Daily   • folic acid  1 mg Oral Daily   • Levothyroxine Sodium  75 mcg Oral Before breakfast   • famoTIDine  20 mg Oral Daily   • Pravastatin Sodium  20 mg Oral Nightly   • Insulin Aspart Pen  1-5 Units Subcutaneous TID CC and HS        hy

## 2017-09-18 NOTE — PROGRESS NOTES
[address] [facility]  TEL: [phone]  FAX: [phone]    Jenni Henrry Patient Status:  Inpatient    1925 MRN [id number]   [facility] 3NE-A Attending Kayla Arcos MD   Hosp Day # 5 PCP Yovany Avilez lab  - unclear source. - could represent skin jackie but less likely since no lines or prosthetic devices. Had high fevers on admission with 2/2 +Bcx, assume these are not contaminated Bcx.      2.  Abnl UA/ pyuria- could just be due to trauma from cathete

## 2017-09-18 NOTE — CM/SW NOTE
Order received to call the POA regarding a request for additional information for services/support at home. The pt is A/O and reportedly independent. Right now he is working with PT, so attempted visit unsuccessful at this time.  Will follow up with the pat

## 2017-09-18 NOTE — CM/SW NOTE
Met with the pt, his wife and son at the bedside. The family of the patient feels strongly that the pt should go to [facility]. At this time PT recc HHC and family support.  Medicare guidelines for [facility] coverage and eligibility based on having a skilled need were d

## 2017-09-18 NOTE — PHYSICAL THERAPY NOTE
PHYSICAL THERAPY TREATMENT NOTE - INPATIENT    Room Number: 5098/6763-Y     Session: 2  Number of Visits to Meet Established Goals: 5    Presenting Problem: Acute cystitis w/ hematuria, Sepsis     History related to current admission: Pt is 80year old ma Ratin  Location: denies pn       BALANCE     Static Sitting: Fair +  Dynamic Sitting: Fair -           Static Standing: Fair -  Dynamic Standing: Poor +    ACTIVITY TOLERANCE  Room air  No shortness of breath  Heart Rate: at rest 70-75 bpm and with a session/findings; All patient questions and concerns addressed    ASSESSMENT   Pt exhibits improvements in strength, balance, and endurance as pt is able to significantly increase ambulation distance.   The pt will continue to benefit from physical therapy i

## 2017-09-19 NOTE — PROGRESS NOTES
NURSING DISCHARGE NOTE    Discharged Nursing home via Wheelchair. Accompanied by Family member and Support staff  Belongings Taken by patient/family. Patient for discharge to [facility]. Vital signs stable at time of discharge.  Discharge instruct

## 2017-09-19 NOTE — PROGRESS NOTES
[address] [facility]  TEL: [phone]  FAX: [phone]    Fredrick Terry Patient Status:  Inpatient    1925 MRN [id number]   [facility] 3NE-A Attending Ryan Sexton MD   Hosp Day # 6 PCP Gabe Conteh due to above    3.  Urinary retention- probably related to UT and known BPH     PLAN:     -ok for d/c today if ok with other MDs, will d/c on amoxicillin to complete 2 weeks (Actinobacilum schaalii is carmen to PCN drugs and these are the first line drugs for

## 2017-09-19 NOTE — DISCHARGE SUMMARY
General Medicine Discharge Summary     Patient ID:  Tammy Ho  80year old  7/28/1925    Admit date: 9/13/2017    Discharge date and time: 9/19/2017    Attending Physician: Nita Barrera, with heme chronically, ~8 is usual baseline  -drop suspected due to aggressive IVF resuscitation/dilutional.  No evidence for active bleeding  -transfuse 1 U PRBC 9/14.  -due for cyanocobalamin injection on 9/15 (ordered)     Carcinoid  -gets sandostatin Q by mouth daily. Multiple Vitamins-Minerals (OCUVITE ADULT FORMULA OR)  2 tablets daily    Acetaminophen (TYLENOL EXTRA STRENGTH OR)  Take 500 mg by mouth every 6 (six) hours as needed (pain).  As needed     Cyanocobalamin 1000 MCG/ML Injection Solution

## 2017-09-19 NOTE — PHYSICAL THERAPY NOTE
PHYSICAL THERAPY TREATMENT NOTE - INPATIENT    Room Number: 9878/0051-K     Session: 3  Number of Visits to Meet Established Goals: 5    Presenting Problem: Acute cystitis w/ hematuria, Sepsis     History related to current admission: Pt is 80year old ma PAIN ASSESSMENT   Ratin  Location: denies pn       BALANCE     Static Sitting: Fair +  Dynamic Sitting: Fair +           Static Standing: Fair -  Dynamic Standing: Fair -    ACTIVITY TOLERANCE  Room air  No shortness of breath       AM-PAC '6-Click flat position with Mod I.    Pt's son and spouse, were present at bedside throughout evaluation. Had a long discussion with pt and family regarding discharge recommendations and therapy goals. Recommend pt ambulate with use of a RW at all times.     Lazaro

## 2017-09-19 NOTE — CM/SW NOTE
Paged PT assigned to the patient at 8 AM with request to call CM to estimate the time of their PT treatment today.  Family wishes to be informed so that they can be here and ask questions, see how patient is performing with PT. RN has also been informed of

## 2017-09-19 NOTE — CM/SW NOTE
Pt met with Angelina Yun from [facility] C:[phone]. They have accepted the patient and the time of dc will be 1830. The RN has agreed, and the pt should have dinner prior to dc. The family will  clothing and bring it back prior to dc.  Pt to

## 2017-09-19 NOTE — PLAN OF CARE
Patient alert and oriented x4. Patient denies pain this morning. Patient with issues of urinary retention overnight and had to be straight cathed. Patient able to urinate by himself this morning. Will continue to monitor and bladder scan if needed.  Patient

## 2017-09-19 NOTE — CM/SW NOTE
CM met with PT after therapy today. The pt is doing well and is not meeting the criteria for [facility]. The family is very concerned that the pt is not reado to go home. Again alternatives were discussed, HHC/caregiver, [facility] as self pay with OPPT services.  They u

## 2017-09-19 NOTE — OCCUPATIONAL THERAPY NOTE
OCCUPATIONAL THERAPY TREATMENT NOTE - INPATIENT     Room Number: 3912/1109-P  Session: 2   Number of Visits to Meet Established Goals: 7    Presenting Problem: Acute cystitis w/ hematuria; sepsis    History related to current admission: Pt is 80year old m no pain  Management Techniques: Repositioning     ACTIVITY TOLERANCE  Room air  No shortness of breath    ACTIVITIES OF DAILY LIVING ASSESSMENT  AM-PAC ‘6-Clicks’ Inpatient Daily Activity Short Form  How much help from another person does the patient jer Recommendations: Reacher;Sock aid;Long-handled shoehorn;Transfer tub bench    PLAN  OT Treatment Plan: Balance activities; Energy conservation/work simplification techniques;ADL training;Functional transfer training;UE strengthening/ROM; Endurance training;C

## 2017-09-20 NOTE — PROGRESS NOTES
Craig Fuentes  : 1925  Age 80year old  male patient is admitted to Facility: [place] [address] for [facility] after hospitalization for urosepsis and weakness.     [address] [place] [address] Drive date:    17  Discharge date to [facility]:    17  ALONZO complication, not stated as uncontrolled    • Unspecified essential hypertension      Past Surgical History:  No date: CATARACT  No date: COLONOSCOPY  No date: TONSILLECTOMY  Family History   Problem Relation Age of Onset   • Heart Disorder Sister    • Hea Oral Cap Take 1,200 mg by mouth daily. Disp:  Rfl:    Cholecalciferol (VITAMIN D) 1000 UNITS Oral Tab Take 1,000 Units by mouth daily.  Disp:  Rfl:    Carboxymethylcellulose Sodium (REFRESH PLUS) 0.5 % Ophthalmic Solution 1 drop 3 (three) times daily as nee food or seasonal allergies      PHYSICAL EXAM:  GENERAL HEALTH: well developed, well nourished, in no apparent distress  LINES, TUBES, DRAINS:  none  SKIN: no rashes, no suspicious lesions, pale, warm, dry  WOUND: none  EYES: PERRLA, EOMI, sclera anicteric HCT 26.7 (L) 09/20/2017   .2 (H) 09/20/2017   MCH 35.3 (H) 09/20/2017   MCHC 33.0 09/20/2017   RDW 17.3 (H) 09/20/2017   .0 09/20/2017   MPV 9.2 (L) 12/01/2012       Lab Results  Component Value Date   MG 2.1 09/20/2017       Lab Results  C medical records, labs, completing medication reconciliation and entering orders to establish plan of care in [facility].       HORACE Hillman  09/20/17   9:30 AM

## 2017-09-25 NOTE — PROGRESS NOTES
Mary Kramer, 7/28/1925, 80year old, male    Chief Complaint:  Patient presents with:   Follow - Up: s/p fall and urosepsis       Subjective:  PMH significant for carcinoid, macular degeneration, T2 DM, hypothyroid, LESLIE, HTN w/ CKD 4, HL, GERD, neurop deficit, cranial nerves intact II-XII, follows commands  PSYCHIATRIC: alert and oriented x 3; affect appropriate.     Medications reviewed: Yes    Diagnostics reviewed:      Lab Results  Component Value Date   GLU 83 09/25/2017   BUN 37 (H) 09/25/2017   CR HORACE  9/25/2017  10:45 AM

## 2017-09-27 NOTE — PROGRESS NOTES
Sydney Flores, 7/28/1925, 80year old, male is being discharged from Facility: [address]    Date of Admission:  9.19.17    Date of Discharge:  9.27.17                                 Admitting Diagnoses:  1.  [facility] EOMI, sclera anicteric, conjunctiva normal; there is no nystagmus, no drainage from eyes; +IOP b/l  HENT: normocephalic; normal nose, no nasal drainage, mucous membranes pink, moist, pharynx no exudate, no visible cerumen.   NECK: supple; FROM; no JVD, no T 09/27/2017       Lab Results  Component Value Date   WBC 6.0 09/27/2017   RBC 2.37 (L) 09/27/2017   HGB 8.3 (L) 09/27/2017   HCT 25.6 (L) 09/27/2017   .0 (H) 09/27/2017   MCH 35.0 (H) 09/27/2017   MCHC 32.4 09/27/2017   RDW 17.0 (H) 09/27/2017   PLT preparation for discharge.     HORACE Mike  9/27/2017  8:40 AM      @1900 received UA results:    Lab Results  Component Value Date   COLORUR Yellow 09/27/2017   CLARITY Cloudy (A) 09/27/2017   SPECGRAVITY 1.011 09/27/2017   GLUUR Negative 09/27/2017

## 2017-10-03 PROBLEM — A41.9 SEPSIS, DUE TO UNSPECIFIED ORGANISM: Status: RESOLVED | Noted: 2017-01-01 | Resolved: 2017-01-01

## 2017-10-03 PROBLEM — R55 SYNCOPE AND COLLAPSE: Status: RESOLVED | Noted: 2017-01-01 | Resolved: 2017-01-01

## 2017-10-03 PROBLEM — N30.01 ACUTE CYSTITIS WITH HEMATURIA: Status: RESOLVED | Noted: 2017-01-01 | Resolved: 2017-01-01

## 2017-10-16 NOTE — PATIENT INSTRUCTIONS
Current plan is now that the infection is gone he can go back to use of methotrexate 2 tablets a week to treat the psoriasis and psoriatic arthritis. When there is a severe infection in your on antibiotics then you have to stop the methotrexate.   As you k

## 2017-10-16 NOTE — PROGRESS NOTES
[facility]  Dr. Luis Bueno Progress Note     Subjective:   Yoseph Oritz is a(n) 80year old male. Current complaints: Patient presents with:  Psoriatic Arthritis: 4 month f/u.  Labs 9/25/17-creatinine-2.65, BUN-42, GFR-20, Last month hospitalized x then you have to stop the methotrexate. As you know your kidneys are sluggish therefore you cannot take medicines like full dose aspirin or ibuprofen or Aleve. Baby aspirin is ok once a day.  For pain we are going to order Vicodin 1 or 2 tablets during th

## 2018-01-01 ENCOUNTER — APPOINTMENT (OUTPATIENT)
Dept: GENERAL RADIOLOGY | Facility: HOSPITAL | Age: 83
DRG: 853 | End: 2018-01-01
Payer: MEDICARE

## 2018-01-01 ENCOUNTER — ANESTHESIA (OUTPATIENT)
Dept: SURGERY | Facility: HOSPITAL | Age: 83
DRG: 853 | End: 2018-01-01
Payer: MEDICARE

## 2018-01-01 ENCOUNTER — APPOINTMENT (OUTPATIENT)
Dept: GENERAL RADIOLOGY | Facility: HOSPITAL | Age: 83
DRG: 853 | End: 2018-01-01
Attending: INTERNAL MEDICINE
Payer: MEDICARE

## 2018-01-01 ENCOUNTER — APPOINTMENT (OUTPATIENT)
Dept: GENERAL RADIOLOGY | Facility: HOSPITAL | Age: 83
DRG: 853 | End: 2018-01-01
Attending: EMERGENCY MEDICINE
Payer: MEDICARE

## 2018-01-01 ENCOUNTER — SURGERY (OUTPATIENT)
Age: 83
End: 2018-01-01

## 2018-01-01 ENCOUNTER — HOSPITAL ENCOUNTER (INPATIENT)
Facility: HOSPITAL | Age: 83
LOS: 15 days | Discharge: HOSPICE | DRG: 853 | End: 2018-01-01
Attending: EMERGENCY MEDICINE | Admitting: INTERNAL MEDICINE
Payer: MEDICARE

## 2018-01-01 ENCOUNTER — APPOINTMENT (OUTPATIENT)
Dept: GENERAL RADIOLOGY | Facility: HOSPITAL | Age: 83
DRG: 853 | End: 2018-01-01
Attending: UROLOGY
Payer: MEDICARE

## 2018-01-01 ENCOUNTER — APPOINTMENT (OUTPATIENT)
Dept: ULTRASOUND IMAGING | Facility: HOSPITAL | Age: 83
DRG: 853 | End: 2018-01-01
Attending: UROLOGY
Payer: MEDICARE

## 2018-01-01 ENCOUNTER — ANESTHESIA EVENT (OUTPATIENT)
Dept: SURGERY | Facility: HOSPITAL | Age: 83
DRG: 853 | End: 2018-01-01
Payer: MEDICARE

## 2018-01-01 ENCOUNTER — APPOINTMENT (OUTPATIENT)
Dept: CT IMAGING | Facility: HOSPITAL | Age: 83
DRG: 853 | End: 2018-01-01
Attending: INTERNAL MEDICINE
Payer: MEDICARE

## 2018-01-01 ENCOUNTER — HOSPITAL ENCOUNTER (INPATIENT)
Facility: HOSPITAL | Age: 83
LOS: 4 days | DRG: 871 | End: 2018-01-01
Attending: INTERNAL MEDICINE | Admitting: INTERNAL MEDICINE
Payer: OTHER MISCELLANEOUS

## 2018-01-01 VITALS
HEART RATE: 85 BPM | RESPIRATION RATE: 12 BRPM | TEMPERATURE: 98 F | SYSTOLIC BLOOD PRESSURE: 90 MMHG | DIASTOLIC BLOOD PRESSURE: 44 MMHG | OXYGEN SATURATION: 76 %

## 2018-01-01 VITALS
BODY MASS INDEX: 21.6 KG/M2 | TEMPERATURE: 98 F | HEART RATE: 76 BPM | HEIGHT: 73 IN | WEIGHT: 162.94 LBS | RESPIRATION RATE: 18 BRPM | OXYGEN SATURATION: 93 % | DIASTOLIC BLOOD PRESSURE: 55 MMHG | SYSTOLIC BLOOD PRESSURE: 115 MMHG

## 2018-01-01 DIAGNOSIS — E86.0 DEHYDRATION: ICD-10-CM

## 2018-01-01 DIAGNOSIS — R31.9 HEMATURIA: ICD-10-CM

## 2018-01-01 DIAGNOSIS — N17.9 AKI (ACUTE KIDNEY INJURY) (HCC): Primary | ICD-10-CM

## 2018-01-01 DIAGNOSIS — Z65.3 ALLEGED DRUG DIVERSION: ICD-10-CM

## 2018-01-01 LAB
25-HYDROXYVITAMIN D (TOTAL): 21 NG/ML (ref 30–100)
ADENOVIRUS PCR:: NEGATIVE
ALBUMIN SERPL-MCNC: 1.7 G/DL (ref 3.5–4.8)
ALBUMIN SERPL-MCNC: 1.7 G/DL (ref 3.5–4.8)
ALBUMIN SERPL-MCNC: 1.8 G/DL (ref 3.5–4.8)
ALBUMIN SERPL-MCNC: 1.9 G/DL (ref 3.5–4.8)
ALBUMIN SERPL-MCNC: 2.2 G/DL (ref 3.5–4.8)
ALBUMIN SERPL-MCNC: 2.3 G/DL (ref 3.5–4.8)
ALP LIVER SERPL-CCNC: 86 U/L (ref 45–117)
ALT SERPL-CCNC: 22 U/L (ref 17–63)
AST SERPL-CCNC: 24 U/L (ref 15–41)
ATRIAL RATE: 100 BPM
B PERT DNA SPEC QL NAA+PROBE: NEGATIVE
BASOPHIL % MANUAL: 1 %
BASOPHIL ABSOLUTE MANUAL: 0.15 X10(3) UL (ref 0–0.1)
BASOPHILS # BLD AUTO: 0.01 X10(3) UL (ref 0–0.1)
BASOPHILS # BLD AUTO: 0.02 X10(3) UL (ref 0–0.1)
BASOPHILS NFR BLD AUTO: 0.1 %
BASOPHILS NFR BLD AUTO: 0.2 %
BASOPHILS NFR BLD AUTO: 0.2 %
BILIRUB SERPL-MCNC: 1.2 MG/DL (ref 0.1–2)
BILIRUB UR QL STRIP.AUTO: NEGATIVE
BUN BLD-MCNC: 46 MG/DL (ref 8–20)
BUN BLD-MCNC: 47 MG/DL (ref 8–20)
BUN BLD-MCNC: 48 MG/DL (ref 8–20)
BUN BLD-MCNC: 48 MG/DL (ref 8–20)
BUN BLD-MCNC: 50 MG/DL (ref 8–20)
BUN BLD-MCNC: 53 MG/DL (ref 8–20)
BUN BLD-MCNC: 55 MG/DL (ref 8–20)
BUN BLD-MCNC: 56 MG/DL (ref 8–20)
BUN BLD-MCNC: 59 MG/DL (ref 8–20)
BUN BLD-MCNC: 60 MG/DL (ref 8–20)
BUN BLD-MCNC: 68 MG/DL (ref 8–20)
BUN BLD-MCNC: 74 MG/DL (ref 8–20)
BUN BLD-MCNC: 76 MG/DL (ref 8–20)
BUN BLD-MCNC: 85 MG/DL (ref 8–20)
C PNEUM DNA SPEC QL NAA+PROBE: NEGATIVE
CALCIUM BLD-MCNC: 7.1 MG/DL (ref 8.3–10.3)
CALCIUM BLD-MCNC: 7.2 MG/DL (ref 8.3–10.3)
CALCIUM BLD-MCNC: 7.4 MG/DL (ref 8.3–10.3)
CALCIUM BLD-MCNC: 7.4 MG/DL (ref 8.3–10.3)
CALCIUM BLD-MCNC: 7.5 MG/DL (ref 8.3–10.3)
CALCIUM BLD-MCNC: 7.6 MG/DL (ref 8.3–10.3)
CALCIUM BLD-MCNC: 7.7 MG/DL (ref 8.3–10.3)
CALCIUM BLD-MCNC: 7.7 MG/DL (ref 8.3–10.3)
CALCIUM BLD-MCNC: 7.8 MG/DL (ref 8.3–10.3)
CALCIUM BLD-MCNC: 7.9 MG/DL (ref 8.3–10.3)
CALCIUM BLD-MCNC: 8 MG/DL (ref 8.3–10.3)
CALCIUM BLD-MCNC: 8.1 MG/DL (ref 8.3–10.3)
CHLORIDE: 100 MMOL/L (ref 101–111)
CHLORIDE: 100 MMOL/L (ref 101–111)
CHLORIDE: 101 MMOL/L (ref 101–111)
CHLORIDE: 102 MMOL/L (ref 101–111)
CHLORIDE: 105 MMOL/L (ref 101–111)
CHLORIDE: 106 MMOL/L (ref 101–111)
CHLORIDE: 107 MMOL/L (ref 101–111)
CHLORIDE: 108 MMOL/L (ref 101–111)
CHLORIDE: 109 MMOL/L (ref 101–111)
CHLORIDE: 109 MMOL/L (ref 101–111)
CHLORIDE: 93 MMOL/L (ref 101–111)
CHLORIDE: 95 MMOL/L (ref 101–111)
CHLORIDE: 95 MMOL/L (ref 101–111)
CHLORIDE: 96 MMOL/L (ref 101–111)
CHLORIDE: 98 MMOL/L (ref 101–111)
CHLORIDE: 99 MMOL/L (ref 101–111)
CLARITY UR REFRACT.AUTO: CLEAR
CO2: 15 MMOL/L (ref 22–32)
CO2: 16 MMOL/L (ref 22–32)
CO2: 17 MMOL/L (ref 22–32)
CO2: 17 MMOL/L (ref 22–32)
CO2: 19 MMOL/L (ref 22–32)
CO2: 20 MMOL/L (ref 22–32)
CO2: 21 MMOL/L (ref 22–32)
CO2: 21 MMOL/L (ref 22–32)
CO2: 23 MMOL/L (ref 22–32)
CO2: 23 MMOL/L (ref 22–32)
CO2: 24 MMOL/L (ref 22–32)
CO2: 26 MMOL/L (ref 22–32)
CO2: 26 MMOL/L (ref 22–32)
CO2: 27 MMOL/L (ref 22–32)
CO2: 27 MMOL/L (ref 22–32)
CO2: 28 MMOL/L (ref 22–32)
COLOR UR AUTO: YELLOW
CORONAVIRUS 229E PCR:: NEGATIVE
CORONAVIRUS HKU1 PCR:: NEGATIVE
CORONAVIRUS NL63 PCR:: NEGATIVE
CORONAVIRUS OC43 PCR:: NEGATIVE
CREAT BLD-MCNC: 4.48 MG/DL (ref 0.7–1.3)
CREAT BLD-MCNC: 4.82 MG/DL (ref 0.7–1.3)
CREAT BLD-MCNC: 4.96 MG/DL (ref 0.7–1.3)
CREAT BLD-MCNC: 5.26 MG/DL (ref 0.7–1.3)
CREAT BLD-MCNC: 5.4 MG/DL (ref 0.7–1.3)
CREAT BLD-MCNC: 5.7 MG/DL (ref 0.7–1.3)
CREAT BLD-MCNC: 5.83 MG/DL (ref 0.7–1.3)
CREAT BLD-MCNC: 5.99 MG/DL (ref 0.7–1.3)
CREAT BLD-MCNC: 6.02 MG/DL (ref 0.7–1.3)
CREAT BLD-MCNC: 6.23 MG/DL (ref 0.7–1.3)
CREAT BLD-MCNC: 6.24 MG/DL (ref 0.7–1.3)
CREAT BLD-MCNC: 6.54 MG/DL (ref 0.7–1.3)
CREAT BLD-MCNC: 7.14 MG/DL (ref 0.7–1.3)
CREAT BLD-MCNC: 7.98 MG/DL (ref 0.7–1.3)
CREAT BLD-MCNC: 8.82 MG/DL (ref 0.7–1.3)
CREAT BLD-MCNC: 9.38 MG/DL (ref 0.7–1.3)
CREAT UR-SCNC: 41.7 MG/DL
CREAT UR-SCNC: 63.4 MG/DL
CREAT UR-SCNC: 65 MG/DL
CREAT UR-SCNC: <13 MG/DL
EOSINOPHIL # BLD AUTO: 0.01 X10(3) UL (ref 0–0.3)
EOSINOPHIL # BLD AUTO: 0.01 X10(3) UL (ref 0–0.3)
EOSINOPHIL # BLD AUTO: 0.04 X10(3) UL (ref 0–0.3)
EOSINOPHIL # BLD AUTO: 0.04 X10(3) UL (ref 0–0.3)
EOSINOPHIL # BLD AUTO: 0.13 X10(3) UL (ref 0–0.3)
EOSINOPHIL % MANUAL: 1 %
EOSINOPHIL ABSOLUTE MANUAL: 0.15 X10(3) UL (ref 0–0.3)
EOSINOPHIL NFR BLD AUTO: 0.1 %
EOSINOPHIL NFR BLD AUTO: 0.1 %
EOSINOPHIL NFR BLD AUTO: 0.2 %
EOSINOPHIL NFR BLD AUTO: 0.4 %
EOSINOPHIL NFR BLD AUTO: 1.2 %
ERYTHROCYTE [DISTWIDTH] IN BLOOD BY AUTOMATED COUNT: 14.2 % (ref 11.5–16)
ERYTHROCYTE [DISTWIDTH] IN BLOOD BY AUTOMATED COUNT: 14.6 % (ref 11.5–16)
ERYTHROCYTE [DISTWIDTH] IN BLOOD BY AUTOMATED COUNT: 14.6 % (ref 11.5–16)
ERYTHROCYTE [DISTWIDTH] IN BLOOD BY AUTOMATED COUNT: 14.7 % (ref 11.5–16)
ERYTHROCYTE [DISTWIDTH] IN BLOOD BY AUTOMATED COUNT: 14.7 % (ref 11.5–16)
ERYTHROCYTE [DISTWIDTH] IN BLOOD BY AUTOMATED COUNT: 14.8 % (ref 11.5–16)
ERYTHROCYTE [DISTWIDTH] IN BLOOD BY AUTOMATED COUNT: 15 % (ref 11.5–16)
FLUAV RNA SPEC QL NAA+PROBE: NEGATIVE
FLUBV RNA SPEC QL NAA+PROBE: NEGATIVE
GLUCOSE BLD-MCNC: 146 MG/DL (ref 65–99)
GLUCOSE BLD-MCNC: 162 MG/DL (ref 70–99)
GLUCOSE BLD-MCNC: 164 MG/DL (ref 65–99)
GLUCOSE BLD-MCNC: 179 MG/DL (ref 70–99)
GLUCOSE BLD-MCNC: 214 MG/DL (ref 70–99)
GLUCOSE BLD-MCNC: 219 MG/DL (ref 65–99)
GLUCOSE BLD-MCNC: 224 MG/DL (ref 70–99)
GLUCOSE BLD-MCNC: 230 MG/DL (ref 70–99)
GLUCOSE BLD-MCNC: 235 MG/DL (ref 65–99)
GLUCOSE BLD-MCNC: 242 MG/DL (ref 65–99)
GLUCOSE BLD-MCNC: 242 MG/DL (ref 65–99)
GLUCOSE BLD-MCNC: 243 MG/DL (ref 65–99)
GLUCOSE BLD-MCNC: 243 MG/DL (ref 70–99)
GLUCOSE BLD-MCNC: 243 MG/DL (ref 70–99)
GLUCOSE BLD-MCNC: 247 MG/DL (ref 70–99)
GLUCOSE BLD-MCNC: 262 MG/DL (ref 70–99)
GLUCOSE BLD-MCNC: 264 MG/DL (ref 65–99)
GLUCOSE BLD-MCNC: 267 MG/DL (ref 70–99)
GLUCOSE BLD-MCNC: 280 MG/DL (ref 70–99)
GLUCOSE BLD-MCNC: 281 MG/DL (ref 65–99)
GLUCOSE BLD-MCNC: 283 MG/DL (ref 70–99)
GLUCOSE BLD-MCNC: 288 MG/DL (ref 70–99)
GLUCOSE BLD-MCNC: 307 MG/DL (ref 65–99)
GLUCOSE BLD-MCNC: 313 MG/DL (ref 65–99)
GLUCOSE BLD-MCNC: 322 MG/DL (ref 65–99)
GLUCOSE BLD-MCNC: 325 MG/DL (ref 70–99)
GLUCOSE BLD-MCNC: 329 MG/DL (ref 65–99)
GLUCOSE BLD-MCNC: 340 MG/DL (ref 70–99)
GLUCOSE BLD-MCNC: 375 MG/DL (ref 70–99)
GLUCOSE BLD-MCNC: 376 MG/DL (ref 65–99)
GLUCOSE UR STRIP.AUTO-MCNC: NEGATIVE MG/DL
GRAN CASTS #/AREA URNS LPF: PRESENT /LPF
HAV IGM SER QL: 2 MG/DL (ref 1.7–3)
HCT VFR BLD AUTO: 25.4 % (ref 37–53)
HCT VFR BLD AUTO: 26.2 % (ref 37–53)
HCT VFR BLD AUTO: 26.4 % (ref 37–53)
HCT VFR BLD AUTO: 26.5 % (ref 37–53)
HCT VFR BLD AUTO: 27.3 % (ref 37–53)
HCT VFR BLD AUTO: 28.8 % (ref 37–53)
HCT VFR BLD AUTO: 29.7 % (ref 37–53)
HGB BLD-MCNC: 8.5 G/DL (ref 13–17)
HGB BLD-MCNC: 8.6 G/DL (ref 13–17)
HGB BLD-MCNC: 8.6 G/DL (ref 13–17)
HGB BLD-MCNC: 8.8 G/DL (ref 13–17)
HGB BLD-MCNC: 8.8 G/DL (ref 13–17)
HGB BLD-MCNC: 9.2 G/DL (ref 13–17)
HGB BLD-MCNC: 9.7 G/DL (ref 13–17)
IMMATURE GRANULOCYTE COUNT: 0.18 X10(3) UL (ref 0–1)
IMMATURE GRANULOCYTE COUNT: 0.19 X10(3) UL (ref 0–1)
IMMATURE GRANULOCYTE COUNT: 0.26 X10(3) UL (ref 0–1)
IMMATURE GRANULOCYTE COUNT: 0.3 X10(3) UL (ref 0–1)
IMMATURE GRANULOCYTE COUNT: 0.41 X10(3) UL (ref 0–1)
IMMATURE GRANULOCYTE RATIO %: 1.6 %
IMMATURE GRANULOCYTE RATIO %: 1.7 %
IMMATURE GRANULOCYTE RATIO %: 1.8 %
IMMATURE GRANULOCYTE RATIO %: 1.8 %
IMMATURE GRANULOCYTE RATIO %: 3.7 %
KETONES UR STRIP.AUTO-MCNC: NEGATIVE MG/DL
KETONES UR STRIP.AUTO-MCNC: NEGATIVE MG/DL
LACTIC ACID: 0.9 MMOL/L (ref 0.5–2)
LACTIC ACID: 1 MMOL/L (ref 0.5–2)
LACTIC ACID: 1.5 MMOL/L (ref 0.5–2)
LYMPHOCYTE % MANUAL: 1 %
LYMPHOCYTE ABSOLUTE MANUAL: 0.15 X10(3) UL (ref 0.9–4)
LYMPHOCYTES # BLD AUTO: 0.39 X10(3) UL (ref 0.9–4)
LYMPHOCYTES # BLD AUTO: 0.57 X10(3) UL (ref 0.9–4)
LYMPHOCYTES # BLD AUTO: 0.58 X10(3) UL (ref 0.9–4)
LYMPHOCYTES # BLD AUTO: 0.71 X10(3) UL (ref 0.9–4)
LYMPHOCYTES # BLD AUTO: 0.77 X10(3) UL (ref 0.9–4)
LYMPHOCYTES NFR BLD AUTO: 3.5 %
LYMPHOCYTES NFR BLD AUTO: 3.9 %
LYMPHOCYTES NFR BLD AUTO: 3.9 %
LYMPHOCYTES NFR BLD AUTO: 5.2 %
LYMPHOCYTES NFR BLD AUTO: 7.3 %
M PROTEIN MFR SERPL ELPH: 7 G/DL (ref 6.1–8.3)
MCH RBC QN AUTO: 33 PG (ref 27–33.2)
MCH RBC QN AUTO: 33 PG (ref 27–33.2)
MCH RBC QN AUTO: 33.1 PG (ref 27–33.2)
MCH RBC QN AUTO: 33.2 PG (ref 27–33.2)
MCH RBC QN AUTO: 33.2 PG (ref 27–33.2)
MCH RBC QN AUTO: 33.6 PG (ref 27–33.2)
MCH RBC QN AUTO: 33.7 PG (ref 27–33.2)
MCHC RBC AUTO-ENTMCNC: 31.9 G/DL (ref 31–37)
MCHC RBC AUTO-ENTMCNC: 32.2 G/DL (ref 31–37)
MCHC RBC AUTO-ENTMCNC: 32.5 G/DL (ref 31–37)
MCHC RBC AUTO-ENTMCNC: 32.7 G/DL (ref 31–37)
MCHC RBC AUTO-ENTMCNC: 32.8 G/DL (ref 31–37)
MCHC RBC AUTO-ENTMCNC: 33.3 G/DL (ref 31–37)
MCHC RBC AUTO-ENTMCNC: 33.5 G/DL (ref 31–37)
MCV RBC AUTO: 100.8 FL (ref 80–99)
MCV RBC AUTO: 101.2 FL (ref 80–99)
MCV RBC AUTO: 101.5 FL (ref 80–99)
MCV RBC AUTO: 101.7 FL (ref 80–99)
MCV RBC AUTO: 102.2 FL (ref 80–99)
MCV RBC AUTO: 105.1 FL (ref 80–99)
MCV RBC AUTO: 99.2 FL (ref 80–99)
METAPNEUMOVIRUS PCR:: NEGATIVE
MONOCYTE % MANUAL: 4 %
MONOCYTE ABSOLUTE MANUAL: 0.59 X10(3) UL (ref 0.1–0.6)
MONOCYTES # BLD AUTO: 1.13 X10(3) UL (ref 0.1–0.6)
MONOCYTES # BLD AUTO: 1.29 X10(3) UL (ref 0.1–0.6)
MONOCYTES # BLD AUTO: 1.55 X10(3) UL (ref 0.1–0.6)
MONOCYTES # BLD AUTO: 1.55 X10(3) UL (ref 0.1–0.6)
MONOCYTES # BLD AUTO: 1.67 X10(3) UL (ref 0.1–0.6)
MONOCYTES NFR BLD AUTO: 10.2 %
MONOCYTES NFR BLD AUTO: 10.6 %
MONOCYTES NFR BLD AUTO: 12.3 %
MONOCYTES NFR BLD AUTO: 13.8 %
MONOCYTES NFR BLD AUTO: 9.2 %
MORPHOLOGY: NORMAL
MYCOPLASMA PNEUMONIA PCR:: NEGATIVE
NEUTROPHIL ABS PRELIM: 12.27 X10 (3) UL (ref 1.3–6.7)
NEUTROPHIL ABS PRELIM: 12.44 X10 (3) UL (ref 1.3–6.7)
NEUTROPHIL ABS PRELIM: 15.35 X10 (3) UL (ref 1.3–6.7)
NEUTROPHIL ABS PRELIM: 8.12 X10 (3) UL (ref 1.3–6.7)
NEUTROPHIL ABS PRELIM: 8.87 X10 (3) UL (ref 1.3–6.7)
NEUTROPHIL ABS PRELIM: 9.15 X10 (3) UL (ref 1.3–6.7)
NEUTROPHIL ABSOLUTE MANUAL: 13.67 X10(3) UL (ref 1.3–6.7)
NEUTROPHILS # BLD AUTO: 12.27 X10(3) UL (ref 1.3–6.7)
NEUTROPHILS # BLD AUTO: 15.35 X10(3) UL (ref 1.3–6.7)
NEUTROPHILS # BLD AUTO: 8.12 X10(3) UL (ref 1.3–6.7)
NEUTROPHILS # BLD AUTO: 8.87 X10(3) UL (ref 1.3–6.7)
NEUTROPHILS # BLD AUTO: 9.15 X10(3) UL (ref 1.3–6.7)
NEUTROPHILS % MANUAL: 93 %
NEUTROPHILS NFR BLD AUTO: 77.2 %
NEUTROPHILS NFR BLD AUTO: 78.8 %
NEUTROPHILS NFR BLD AUTO: 82.4 %
NEUTROPHILS NFR BLD AUTO: 83.5 %
NEUTROPHILS NFR BLD AUTO: 84.9 %
NITRITE UR QL STRIP.AUTO: NEGATIVE
P AXIS: 9 DEGREES
PARAINFLUENZA 1 PCR:: NEGATIVE
PARAINFLUENZA 2 PCR:: NEGATIVE
PARAINFLUENZA 3 PCR:: NEGATIVE
PARAINFLUENZA 4 PCR:: NEGATIVE
PH UR STRIP.AUTO: 5 [PH] (ref 4.5–8)
PH UR STRIP.AUTO: 6 [PH] (ref 4.5–8)
PH UR STRIP.AUTO: 6 [PH] (ref 4.5–8)
PH UR STRIP.AUTO: 6.5 [PH] (ref 4.5–8)
PHOSPHATE SERPL-MCNC: 3.3 MG/DL (ref 2.5–4.9)
PHOSPHATE SERPL-MCNC: 3.6 MG/DL (ref 2.5–4.9)
PHOSPHATE SERPL-MCNC: 3.9 MG/DL (ref 2.5–4.9)
PHOSPHATE SERPL-MCNC: 4.5 MG/DL (ref 2.5–4.9)
PHOSPHATE SERPL-MCNC: 4.7 MG/DL (ref 2.5–4.9)
PHOSPHATE SERPL-MCNC: 5 MG/DL (ref 2.5–4.9)
PHOSPHATE SERPL-MCNC: 5.3 MG/DL (ref 2.5–4.9)
PHOSPHATE SERPL-MCNC: 5.4 MG/DL (ref 2.5–4.9)
PHOSPHATE SERPL-MCNC: 5.8 MG/DL (ref 2.5–4.9)
PHOSPHATE SERPL-MCNC: 6.2 MG/DL (ref 2.5–4.9)
PLATELET # BLD AUTO: 243 10(3)UL (ref 150–450)
PLATELET # BLD AUTO: 249 10(3)UL (ref 150–450)
PLATELET # BLD AUTO: 265 10(3)UL (ref 150–450)
PLATELET # BLD AUTO: 280 10(3)UL (ref 150–450)
PLATELET # BLD AUTO: 286 10(3)UL (ref 150–450)
PLATELET # BLD AUTO: 300 10(3)UL (ref 150–450)
PLATELET # BLD AUTO: 357 10(3)UL (ref 150–450)
PLATELET MORPHOLOGY: NORMAL
POTASSIUM SERPL-SCNC: 3.4 MMOL/L (ref 3.6–5.1)
POTASSIUM SERPL-SCNC: 3.5 MMOL/L (ref 3.6–5.1)
POTASSIUM SERPL-SCNC: 3.6 MMOL/L (ref 3.6–5.1)
POTASSIUM SERPL-SCNC: 3.6 MMOL/L (ref 3.6–5.1)
POTASSIUM SERPL-SCNC: 3.7 MMOL/L (ref 3.6–5.1)
POTASSIUM SERPL-SCNC: 3.7 MMOL/L (ref 3.6–5.1)
POTASSIUM SERPL-SCNC: 3.8 MMOL/L (ref 3.6–5.1)
POTASSIUM SERPL-SCNC: 3.9 MMOL/L (ref 3.6–5.1)
POTASSIUM SERPL-SCNC: 4.2 MMOL/L (ref 3.6–5.1)
POTASSIUM SERPL-SCNC: 4.3 MMOL/L (ref 3.6–5.1)
POTASSIUM SERPL-SCNC: 4.8 MMOL/L (ref 3.6–5.1)
PROT UR STRIP.AUTO-MCNC: 30 MG/DL
PTH-INTACT SERPL-MCNC: 239.3 PG/ML (ref 11.1–79.5)
Q-T INTERVAL: 416 MS
QRS DURATION: 86 MS
QTC CALCULATION (BEZET): 517 MS
R AXIS: -39 DEGREES
RBC # BLD AUTO: 2.52 X10(6)UL (ref 3.8–5.8)
RBC # BLD AUTO: 2.59 X10(6)UL (ref 3.8–5.8)
RBC # BLD AUTO: 2.61 X10(6)UL (ref 3.8–5.8)
RBC # BLD AUTO: 2.66 X10(6)UL (ref 3.8–5.8)
RBC # BLD AUTO: 2.67 X10(6)UL (ref 3.8–5.8)
RBC # BLD AUTO: 2.74 X10(6)UL (ref 3.8–5.8)
RBC # BLD AUTO: 2.92 X10(6)UL (ref 3.8–5.8)
RBC #/AREA URNS AUTO: >10 /HPF
RBC #/AREA URNS AUTO: >10 /HPF
RED CELL DISTRIBUTION WIDTH-SD: 52.4 FL (ref 35.1–46.3)
RED CELL DISTRIBUTION WIDTH-SD: 53 FL (ref 35.1–46.3)
RED CELL DISTRIBUTION WIDTH-SD: 53 FL (ref 35.1–46.3)
RED CELL DISTRIBUTION WIDTH-SD: 54 FL (ref 35.1–46.3)
RED CELL DISTRIBUTION WIDTH-SD: 54.5 FL (ref 35.1–46.3)
RED CELL DISTRIBUTION WIDTH-SD: 55.3 FL (ref 35.1–46.3)
RED CELL DISTRIBUTION WIDTH-SD: 58 FL (ref 35.1–46.3)
RHINOVIRUS/ENTERO PCR:: NEGATIVE
RSV RNA SPEC QL NAA+PROBE: NEGATIVE
SODIUM SERPL-SCNC: 130 MMOL/L (ref 136–144)
SODIUM SERPL-SCNC: 131 MMOL/L (ref 136–144)
SODIUM SERPL-SCNC: 131 MMOL/L (ref 136–144)
SODIUM SERPL-SCNC: 133 MMOL/L (ref 136–144)
SODIUM SERPL-SCNC: 134 MMOL/L (ref 136–144)
SODIUM SERPL-SCNC: 136 MMOL/L (ref 136–144)
SODIUM SERPL-SCNC: 137 MMOL/L (ref 136–144)
SODIUM SERPL-SCNC: 138 MMOL/L (ref 136–144)
SODIUM SERPL-SCNC: 138 MMOL/L (ref 136–144)
SODIUM SERPL-SCNC: 27 MMOL/L
SODIUM SERPL-SCNC: 38 MMOL/L
SODIUM SERPL-SCNC: 45 MMOL/L
SODIUM SERPL-SCNC: 48 MMOL/L
SP GR UR STRIP.AUTO: 1.01 (ref 1–1.03)
SP GR UR STRIP.AUTO: 1.01 (ref 1–1.03)
SP GR UR STRIP.AUTO: 1.02 (ref 1–1.03)
SP GR UR STRIP.AUTO: 1.02 (ref 1–1.03)
T AXIS: 33 DEGREES
TOTAL CELLS COUNTED: 100
UREA UR RANDOM: 226 MG/DL
UREA UR RANDOM: 318 MG/DL
UREA UR RANDOM: 369 MG/DL
UREA UR RANDOM: <10 MG/DL
UROBILINOGEN UR STRIP.AUTO-MCNC: 0.2 MG/DL
UROBILINOGEN UR STRIP.AUTO-MCNC: <2 MG/DL
VENTRICULAR RATE: 93 BPM
WBC # BLD AUTO: 10.5 X10(3) UL (ref 4–13)
WBC # BLD AUTO: 11.1 X10(3) UL (ref 4–13)
WBC # BLD AUTO: 11.2 X10(3) UL (ref 4–13)
WBC # BLD AUTO: 14.7 X10(3) UL (ref 4–13)
WBC # BLD AUTO: 14.7 X10(3) UL (ref 4–13)
WBC # BLD AUTO: 15 X10(3) UL (ref 4–13)
WBC # BLD AUTO: 18.1 X10(3) UL (ref 4–13)
WBC #/AREA URNS AUTO: >50 /HPF
WBC CLUMPS UR QL AUTO: PRESENT

## 2018-01-01 PROCEDURE — P9047 ALBUMIN (HUMAN), 25%, 50ML: HCPCS | Performed by: INTERNAL MEDICINE

## 2018-01-01 PROCEDURE — 84540 ASSAY OF URINE/UREA-N: CPT | Performed by: INTERNAL MEDICINE

## 2018-01-01 PROCEDURE — 0TP98DZ REMOVAL OF INTRALUMINAL DEVICE FROM URETER, VIA NATURAL OR ARTIFICIAL OPENING ENDOSCOPIC: ICD-10-PCS | Performed by: UROLOGY

## 2018-01-01 PROCEDURE — 87633 RESP VIRUS 12-25 TARGETS: CPT | Performed by: INTERNAL MEDICINE

## 2018-01-01 PROCEDURE — 84300 ASSAY OF URINE SODIUM: CPT | Performed by: INTERNAL MEDICINE

## 2018-01-01 PROCEDURE — 87106 FUNGI IDENTIFICATION YEAST: CPT | Performed by: UROLOGY

## 2018-01-01 PROCEDURE — 97530 THERAPEUTIC ACTIVITIES: CPT

## 2018-01-01 PROCEDURE — 80053 COMPREHEN METABOLIC PANEL: CPT | Performed by: EMERGENCY MEDICINE

## 2018-01-01 PROCEDURE — 82570 ASSAY OF URINE CREATININE: CPT | Performed by: INTERNAL MEDICINE

## 2018-01-01 PROCEDURE — 0TBB8ZX EXCISION OF BLADDER, VIA NATURAL OR ARTIFICIAL OPENING ENDOSCOPIC, DIAGNOSTIC: ICD-10-PCS | Performed by: UROLOGY

## 2018-01-01 PROCEDURE — BT14ZZZ FLUOROSCOPY OF KIDNEYS, URETERS AND BLADDER: ICD-10-PCS | Performed by: UROLOGY

## 2018-01-01 PROCEDURE — 83970 ASSAY OF PARATHORMONE: CPT | Performed by: INTERNAL MEDICINE

## 2018-01-01 PROCEDURE — 88305 TISSUE EXAM BY PATHOLOGIST: CPT | Performed by: UROLOGY

## 2018-01-01 PROCEDURE — 80048 BASIC METABOLIC PNL TOTAL CA: CPT | Performed by: HOSPITALIST

## 2018-01-01 PROCEDURE — 93010 ELECTROCARDIOGRAM REPORT: CPT | Performed by: INTERNAL MEDICINE

## 2018-01-01 PROCEDURE — 85025 COMPLETE CBC W/AUTO DIFF WBC: CPT | Performed by: HOSPITALIST

## 2018-01-01 PROCEDURE — 81015 MICROSCOPIC EXAM OF URINE: CPT | Performed by: EMERGENCY MEDICINE

## 2018-01-01 PROCEDURE — 99285 EMERGENCY DEPT VISIT HI MDM: CPT

## 2018-01-01 PROCEDURE — 74420 UROGRAPHY RTRGR +-KUB: CPT | Performed by: UROLOGY

## 2018-01-01 PROCEDURE — 87581 M.PNEUMON DNA AMP PROBE: CPT | Performed by: INTERNAL MEDICINE

## 2018-01-01 PROCEDURE — 0T788DZ DILATION OF BILATERAL URETERS WITH INTRALUMINAL DEVICE, VIA NATURAL OR ARTIFICIAL OPENING ENDOSCOPIC: ICD-10-PCS | Performed by: UROLOGY

## 2018-01-01 PROCEDURE — 81001 URINALYSIS AUTO W/SCOPE: CPT | Performed by: EMERGENCY MEDICINE

## 2018-01-01 PROCEDURE — 82962 GLUCOSE BLOOD TEST: CPT

## 2018-01-01 PROCEDURE — 87186 SC STD MICRODIL/AGAR DIL: CPT | Performed by: INTERNAL MEDICINE

## 2018-01-01 PROCEDURE — 87205 SMEAR GRAM STAIN: CPT | Performed by: INTERNAL MEDICINE

## 2018-01-01 PROCEDURE — 87086 URINE CULTURE/COLONY COUNT: CPT | Performed by: UROLOGY

## 2018-01-01 PROCEDURE — 76937 US GUIDE VASCULAR ACCESS: CPT

## 2018-01-01 PROCEDURE — 87040 BLOOD CULTURE FOR BACTERIA: CPT

## 2018-01-01 PROCEDURE — 85007 BL SMEAR W/DIFF WBC COUNT: CPT

## 2018-01-01 PROCEDURE — 71045 X-RAY EXAM CHEST 1 VIEW: CPT

## 2018-01-01 PROCEDURE — 87077 CULTURE AEROBIC IDENTIFY: CPT | Performed by: UROLOGY

## 2018-01-01 PROCEDURE — 71045 X-RAY EXAM CHEST 1 VIEW: CPT | Performed by: INTERNAL MEDICINE

## 2018-01-01 PROCEDURE — 74430 CONTRAST X-RAY BLADDER: CPT | Performed by: INTERNAL MEDICINE

## 2018-01-01 PROCEDURE — 97110 THERAPEUTIC EXERCISES: CPT

## 2018-01-01 PROCEDURE — 80069 RENAL FUNCTION PANEL: CPT | Performed by: INTERNAL MEDICINE

## 2018-01-01 PROCEDURE — 76000 FLUOROSCOPY <1 HR PHYS/QHP: CPT | Performed by: UROLOGY

## 2018-01-01 PROCEDURE — 87040 BLOOD CULTURE FOR BACTERIA: CPT | Performed by: INTERNAL MEDICINE

## 2018-01-01 PROCEDURE — 87086 URINE CULTURE/COLONY COUNT: CPT | Performed by: EMERGENCY MEDICINE

## 2018-01-01 PROCEDURE — 97116 GAIT TRAINING THERAPY: CPT

## 2018-01-01 PROCEDURE — 87077 CULTURE AEROBIC IDENTIFY: CPT | Performed by: EMERGENCY MEDICINE

## 2018-01-01 PROCEDURE — 85025 COMPLETE CBC W/AUTO DIFF WBC: CPT | Performed by: INTERNAL MEDICINE

## 2018-01-01 PROCEDURE — 36415 COLL VENOUS BLD VENIPUNCTURE: CPT

## 2018-01-01 PROCEDURE — 51600 INJECTION FOR BLADDER X-RAY: CPT | Performed by: INTERNAL MEDICINE

## 2018-01-01 PROCEDURE — 74176 CT ABD & PELVIS W/O CONTRAST: CPT | Performed by: INTERNAL MEDICINE

## 2018-01-01 PROCEDURE — 97168 OT RE-EVAL EST PLAN CARE: CPT

## 2018-01-01 PROCEDURE — 96360 HYDRATION IV INFUSION INIT: CPT

## 2018-01-01 PROCEDURE — 36569 INSJ PICC 5 YR+ W/O IMAGING: CPT

## 2018-01-01 PROCEDURE — 87040 BLOOD CULTURE FOR BACTERIA: CPT | Performed by: EMERGENCY MEDICINE

## 2018-01-01 PROCEDURE — 83605 ASSAY OF LACTIC ACID: CPT | Performed by: EMERGENCY MEDICINE

## 2018-01-01 PROCEDURE — 83735 ASSAY OF MAGNESIUM: CPT | Performed by: INTERNAL MEDICINE

## 2018-01-01 PROCEDURE — 76775 US EXAM ABDO BACK WALL LIM: CPT | Performed by: UROLOGY

## 2018-01-01 PROCEDURE — 87493 C DIFF AMPLIFIED PROBE: CPT | Performed by: HOSPITALIST

## 2018-01-01 PROCEDURE — 93005 ELECTROCARDIOGRAM TRACING: CPT

## 2018-01-01 PROCEDURE — 85025 COMPLETE CBC W/AUTO DIFF WBC: CPT

## 2018-01-01 PROCEDURE — 85007 BL SMEAR W/DIFF WBC COUNT: CPT | Performed by: EMERGENCY MEDICINE

## 2018-01-01 PROCEDURE — 97166 OT EVAL MOD COMPLEX 45 MIN: CPT

## 2018-01-01 PROCEDURE — 80048 BASIC METABOLIC PNL TOTAL CA: CPT | Performed by: INTERNAL MEDICINE

## 2018-01-01 PROCEDURE — 97162 PT EVAL MOD COMPLEX 30 MIN: CPT

## 2018-01-01 PROCEDURE — 85027 COMPLETE CBC AUTOMATED: CPT | Performed by: EMERGENCY MEDICINE

## 2018-01-01 PROCEDURE — 85027 COMPLETE CBC AUTOMATED: CPT | Performed by: INTERNAL MEDICINE

## 2018-01-01 PROCEDURE — 05H533Z INSERTION OF INFUSION DEVICE INTO RIGHT SUBCLAVIAN VEIN, PERCUTANEOUS APPROACH: ICD-10-PCS | Performed by: INTERNAL MEDICINE

## 2018-01-01 PROCEDURE — 85027 COMPLETE CBC AUTOMATED: CPT

## 2018-01-01 PROCEDURE — 87486 CHLMYD PNEUM DNA AMP PROBE: CPT | Performed by: INTERNAL MEDICINE

## 2018-01-01 PROCEDURE — 81001 URINALYSIS AUTO W/SCOPE: CPT | Performed by: UROLOGY

## 2018-01-01 PROCEDURE — 4350F CNSLNG PROVIDED SYMP MNGMNT: CPT | Performed by: INTERNAL MEDICINE

## 2018-01-01 PROCEDURE — 82306 VITAMIN D 25 HYDROXY: CPT | Performed by: INTERNAL MEDICINE

## 2018-01-01 PROCEDURE — 80053 COMPREHEN METABOLIC PANEL: CPT

## 2018-01-01 PROCEDURE — 83605 ASSAY OF LACTIC ACID: CPT

## 2018-01-01 PROCEDURE — 87798 DETECT AGENT NOS DNA AMP: CPT | Performed by: INTERNAL MEDICINE

## 2018-01-01 DEVICE — STENT URET 6F 24CM ULSMTH: Type: IMPLANTABLE DEVICE | Site: URETER | Status: FUNCTIONAL

## 2018-01-01 RX ORDER — METOCLOPRAMIDE 10 MG/1
10 TABLET ORAL EVERY 6 HOURS PRN
Status: DISCONTINUED | OUTPATIENT
Start: 2018-01-01 | End: 2018-01-01

## 2018-01-01 RX ORDER — HYDROCODONE BITARTRATE AND ACETAMINOPHEN 10; 325 MG/1; MG/1
1 TABLET ORAL AS NEEDED
Status: DISCONTINUED | OUTPATIENT
Start: 2018-01-01 | End: 2018-01-01 | Stop reason: HOSPADM

## 2018-01-01 RX ORDER — FOLIC ACID 1 MG/1
1 TABLET ORAL DAILY
Status: DISCONTINUED | OUTPATIENT
Start: 2018-01-01 | End: 2018-01-01

## 2018-01-01 RX ORDER — ERYTHROMYCIN 5 MG/G
1 OINTMENT OPHTHALMIC EVERY 6 HOURS SCHEDULED
Status: DISCONTINUED | OUTPATIENT
Start: 2018-01-01 | End: 2018-01-01

## 2018-01-01 RX ORDER — MORPHINE SULFATE 2 MG/ML
1 INJECTION, SOLUTION INTRAMUSCULAR; INTRAVENOUS
Status: CANCELLED | OUTPATIENT
Start: 2018-01-01

## 2018-01-01 RX ORDER — FAMOTIDINE 20 MG/1
20 TABLET ORAL DAILY
Status: DISCONTINUED | OUTPATIENT
Start: 2018-01-01 | End: 2018-01-01

## 2018-01-01 RX ORDER — ACETAMINOPHEN 325 MG/1
650 TABLET ORAL EVERY 6 HOURS PRN
Status: DISCONTINUED | OUTPATIENT
Start: 2018-01-01 | End: 2018-01-01

## 2018-01-01 RX ORDER — DOCUSATE SODIUM 100 MG/1
100 CAPSULE, LIQUID FILLED ORAL 2 TIMES DAILY
Status: DISCONTINUED | OUTPATIENT
Start: 2018-01-01 | End: 2018-01-01

## 2018-01-01 RX ORDER — FLUCONAZOLE 100 MG/1
400 TABLET ORAL ONCE
Status: COMPLETED | OUTPATIENT
Start: 2018-01-01 | End: 2018-01-01

## 2018-01-01 RX ORDER — HYDROCODONE BITARTRATE AND ACETAMINOPHEN 5; 325 MG/1; MG/1
1 TABLET ORAL AS NEEDED
Status: DISCONTINUED | OUTPATIENT
Start: 2018-01-01 | End: 2018-01-01 | Stop reason: HOSPADM

## 2018-01-01 RX ORDER — SODIUM CHLORIDE 0.9 % (FLUSH) 0.9 %
10 SYRINGE (ML) INJECTION EVERY 12 HOURS
Status: CANCELLED | OUTPATIENT
Start: 2018-01-01

## 2018-01-01 RX ORDER — POLYETHYLENE GLYCOL 3350 17 G/17G
17 POWDER, FOR SOLUTION ORAL DAILY PRN
Status: DISCONTINUED | OUTPATIENT
Start: 2018-01-01 | End: 2018-01-01

## 2018-01-01 RX ORDER — METOCLOPRAMIDE HYDROCHLORIDE 5 MG/ML
5 INJECTION INTRAMUSCULAR; INTRAVENOUS EVERY 6 HOURS PRN
Status: DISCONTINUED | OUTPATIENT
Start: 2018-01-01 | End: 2018-01-01

## 2018-01-01 RX ORDER — ACETAMINOPHEN 650 MG/1
1300 SUPPOSITORY RECTAL ONCE
Status: DISCONTINUED | OUTPATIENT
Start: 2018-01-01 | End: 2018-01-01

## 2018-01-01 RX ORDER — MORPHINE SULFATE 2 MG/ML
2 INJECTION, SOLUTION INTRAMUSCULAR; INTRAVENOUS
Status: DISCONTINUED | OUTPATIENT
Start: 2018-01-01 | End: 2018-01-01

## 2018-01-01 RX ORDER — INSULIN ASPART 100 [IU]/ML
INJECTION, SOLUTION INTRAVENOUS; SUBCUTANEOUS
Status: COMPLETED
Start: 2018-01-01 | End: 2018-01-01

## 2018-01-01 RX ORDER — METOCLOPRAMIDE 5 MG/1
5 TABLET ORAL EVERY 6 HOURS PRN
Status: DISCONTINUED | OUTPATIENT
Start: 2018-01-01 | End: 2018-01-01

## 2018-01-01 RX ORDER — HYDROMORPHONE HYDROCHLORIDE 1 MG/ML
0.4 INJECTION, SOLUTION INTRAMUSCULAR; INTRAVENOUS; SUBCUTANEOUS EVERY 5 MIN PRN
Status: DISCONTINUED | OUTPATIENT
Start: 2018-01-01 | End: 2018-01-01 | Stop reason: HOSPADM

## 2018-01-01 RX ORDER — DEXTROSE MONOHYDRATE 25 G/50ML
50 INJECTION, SOLUTION INTRAVENOUS
Status: DISCONTINUED | OUTPATIENT
Start: 2018-01-01 | End: 2018-01-01

## 2018-01-01 RX ORDER — NALOXONE HYDROCHLORIDE 0.4 MG/ML
80 INJECTION, SOLUTION INTRAMUSCULAR; INTRAVENOUS; SUBCUTANEOUS AS NEEDED
Status: DISCONTINUED | OUTPATIENT
Start: 2018-01-01 | End: 2018-01-01 | Stop reason: HOSPADM

## 2018-01-01 RX ORDER — PRAVASTATIN SODIUM 20 MG
20 TABLET ORAL NIGHTLY
Status: DISCONTINUED | OUTPATIENT
Start: 2018-01-01 | End: 2018-01-01

## 2018-01-01 RX ORDER — SODIUM CHLORIDE 0.9 % (FLUSH) 0.9 %
10 SYRINGE (ML) INJECTION EVERY 12 HOURS
Status: DISCONTINUED | OUTPATIENT
Start: 2018-01-01 | End: 2018-01-01

## 2018-01-01 RX ORDER — FAMOTIDINE 20 MG/1
40 TABLET ORAL DAILY
Status: DISCONTINUED | OUTPATIENT
Start: 2018-01-01 | End: 2018-01-01

## 2018-01-01 RX ORDER — ONDANSETRON 2 MG/ML
4 INJECTION INTRAMUSCULAR; INTRAVENOUS AS NEEDED
Status: DISCONTINUED | OUTPATIENT
Start: 2018-01-01 | End: 2018-01-01 | Stop reason: HOSPADM

## 2018-01-01 RX ORDER — HEPARIN SODIUM 5000 [USP'U]/ML
5000 INJECTION, SOLUTION INTRAVENOUS; SUBCUTANEOUS EVERY 8 HOURS SCHEDULED
Status: DISCONTINUED | OUTPATIENT
Start: 2018-01-01 | End: 2018-01-01

## 2018-01-01 RX ORDER — GLYCOPYRROLATE 0.2 MG/ML
0.4 INJECTION, SOLUTION INTRAMUSCULAR; INTRAVENOUS
Status: DISCONTINUED | OUTPATIENT
Start: 2018-01-01 | End: 2018-01-01

## 2018-01-01 RX ORDER — HYDRALAZINE HYDROCHLORIDE 20 MG/ML
INJECTION INTRAMUSCULAR; INTRAVENOUS
Status: COMPLETED
Start: 2018-01-01 | End: 2018-01-01

## 2018-01-01 RX ORDER — ACETAMINOPHEN 500 MG
1000 TABLET ORAL ONCE AS NEEDED
Status: DISCONTINUED | OUTPATIENT
Start: 2018-01-01 | End: 2018-01-01 | Stop reason: HOSPADM

## 2018-01-01 RX ORDER — IPRATROPIUM BROMIDE AND ALBUTEROL SULFATE 2.5; .5 MG/3ML; MG/3ML
3 SOLUTION RESPIRATORY (INHALATION) EVERY 4 HOURS PRN
Status: DISCONTINUED | OUTPATIENT
Start: 2018-01-01 | End: 2018-01-01

## 2018-01-01 RX ORDER — SCOLOPAMINE TRANSDERMAL SYSTEM 1 MG/1
1 PATCH, EXTENDED RELEASE TRANSDERMAL
Status: DISCONTINUED | OUTPATIENT
Start: 2018-01-01 | End: 2018-01-01

## 2018-01-01 RX ORDER — BISACODYL 10 MG
10 SUPPOSITORY, RECTAL RECTAL
Status: DISCONTINUED | OUTPATIENT
Start: 2018-01-01 | End: 2018-01-01

## 2018-01-01 RX ORDER — HALOPERIDOL 5 MG/ML
2 INJECTION INTRAMUSCULAR
Status: DISCONTINUED | OUTPATIENT
Start: 2018-01-01 | End: 2018-01-01

## 2018-01-01 RX ORDER — LORAZEPAM 2 MG/ML
2 INJECTION INTRAMUSCULAR EVERY 4 HOURS PRN
Status: CANCELLED | OUTPATIENT
Start: 2018-01-01

## 2018-01-01 RX ORDER — METRONIDAZOLE 500 MG/100ML
INJECTION, SOLUTION INTRAVENOUS
Status: DISCONTINUED | OUTPATIENT
Start: 2018-01-01 | End: 2018-01-01

## 2018-01-01 RX ORDER — HALOPERIDOL 5 MG/ML
1 INJECTION INTRAMUSCULAR
Status: DISCONTINUED | OUTPATIENT
Start: 2018-01-01 | End: 2018-01-01

## 2018-01-01 RX ORDER — SODIUM CHLORIDE, SODIUM LACTATE, POTASSIUM CHLORIDE, CALCIUM CHLORIDE 600; 310; 30; 20 MG/100ML; MG/100ML; MG/100ML; MG/100ML
INJECTION, SOLUTION INTRAVENOUS CONTINUOUS
Status: DISCONTINUED | OUTPATIENT
Start: 2018-01-01 | End: 2018-01-01 | Stop reason: HOSPADM

## 2018-01-01 RX ORDER — FLUCONAZOLE 100 MG/1
100 TABLET ORAL DAILY
Status: DISCONTINUED | OUTPATIENT
Start: 2018-01-01 | End: 2018-01-01

## 2018-01-01 RX ORDER — LORAZEPAM 2 MG/ML
0.5 INJECTION INTRAMUSCULAR EVERY 4 HOURS PRN
Status: DISCONTINUED | OUTPATIENT
Start: 2018-01-01 | End: 2018-01-01

## 2018-01-01 RX ORDER — FUROSEMIDE 10 MG/ML
40 INJECTION INTRAMUSCULAR; INTRAVENOUS ONCE
Status: COMPLETED | OUTPATIENT
Start: 2018-01-01 | End: 2018-01-01

## 2018-01-01 RX ORDER — SODIUM CHLORIDE 9 MG/ML
INJECTION, SOLUTION INTRAVENOUS CONTINUOUS
Status: DISCONTINUED | OUTPATIENT
Start: 2018-01-01 | End: 2018-01-01

## 2018-01-01 RX ORDER — LORAZEPAM 2 MG/ML
1 INJECTION INTRAMUSCULAR EVERY 4 HOURS PRN
Status: DISCONTINUED | OUTPATIENT
Start: 2018-01-01 | End: 2018-01-01

## 2018-01-01 RX ORDER — LORAZEPAM 2 MG/ML
2 INJECTION INTRAMUSCULAR EVERY 4 HOURS PRN
Status: DISCONTINUED | OUTPATIENT
Start: 2018-01-01 | End: 2018-01-01

## 2018-01-01 RX ORDER — LEVOTHYROXINE SODIUM 0.07 MG/1
75 TABLET ORAL
Status: DISCONTINUED | OUTPATIENT
Start: 2018-01-01 | End: 2018-01-01

## 2018-01-01 RX ORDER — METOCLOPRAMIDE HYDROCHLORIDE 5 MG/ML
10 INJECTION INTRAMUSCULAR; INTRAVENOUS AS NEEDED
Status: DISCONTINUED | OUTPATIENT
Start: 2018-01-01 | End: 2018-01-01 | Stop reason: HOSPADM

## 2018-01-01 RX ORDER — HYDROCODONE BITARTRATE AND ACETAMINOPHEN 5; 325 MG/1; MG/1
2 TABLET ORAL AS NEEDED
Status: DISCONTINUED | OUTPATIENT
Start: 2018-01-01 | End: 2018-01-01 | Stop reason: HOSPADM

## 2018-01-01 RX ORDER — CETIRIZINE HYDROCHLORIDE 5 MG/1
5 TABLET ORAL DAILY
Status: DISCONTINUED | OUTPATIENT
Start: 2018-01-01 | End: 2018-01-01

## 2018-01-01 RX ORDER — MEPERIDINE HYDROCHLORIDE 25 MG/ML
12.5 INJECTION INTRAMUSCULAR; INTRAVENOUS; SUBCUTANEOUS AS NEEDED
Status: DISCONTINUED | OUTPATIENT
Start: 2018-01-01 | End: 2018-01-01 | Stop reason: HOSPADM

## 2018-01-01 RX ORDER — BISACODYL 10 MG
10 SUPPOSITORY, RECTAL RECTAL
Status: CANCELLED | OUTPATIENT
Start: 2018-01-01

## 2018-01-01 RX ORDER — DEXTROSE MONOHYDRATE 25 G/50ML
50 INJECTION, SOLUTION INTRAVENOUS
Status: DISCONTINUED | OUTPATIENT
Start: 2018-01-01 | End: 2018-01-01 | Stop reason: HOSPADM

## 2018-01-01 RX ORDER — ONDANSETRON 2 MG/ML
4 INJECTION INTRAMUSCULAR; INTRAVENOUS EVERY 6 HOURS PRN
Status: DISCONTINUED | OUTPATIENT
Start: 2018-01-01 | End: 2018-01-01

## 2018-01-01 RX ORDER — METOCLOPRAMIDE HYDROCHLORIDE 5 MG/ML
5 INJECTION INTRAMUSCULAR; INTRAVENOUS EVERY 6 HOURS PRN
Status: CANCELLED | OUTPATIENT
Start: 2018-01-01

## 2018-01-01 RX ORDER — INSULIN ASPART 100 [IU]/ML
INJECTION, SOLUTION INTRAVENOUS; SUBCUTANEOUS ONCE
Status: COMPLETED | OUTPATIENT
Start: 2018-01-01 | End: 2018-01-01

## 2018-01-01 RX ORDER — FINASTERIDE 5 MG/1
5 TABLET, FILM COATED ORAL DAILY
Status: DISCONTINUED | OUTPATIENT
Start: 2018-01-01 | End: 2018-01-01

## 2018-01-01 RX ORDER — TRAMADOL HYDROCHLORIDE 50 MG/1
50 TABLET ORAL EVERY 12 HOURS PRN
Status: DISCONTINUED | OUTPATIENT
Start: 2018-01-01 | End: 2018-01-01

## 2018-01-01 RX ORDER — ONDANSETRON 2 MG/ML
4 INJECTION INTRAMUSCULAR; INTRAVENOUS EVERY 6 HOURS PRN
Status: CANCELLED | OUTPATIENT
Start: 2018-01-01

## 2018-01-01 RX ORDER — ALBUMIN (HUMAN) 12.5 G/50ML
25 SOLUTION INTRAVENOUS ONCE
Status: DISCONTINUED | OUTPATIENT
Start: 2018-01-01 | End: 2018-01-01

## 2018-01-01 RX ORDER — HYDRALAZINE HYDROCHLORIDE 20 MG/ML
10 INJECTION INTRAMUSCULAR; INTRAVENOUS ONCE
Status: COMPLETED | OUTPATIENT
Start: 2018-01-01 | End: 2018-01-01

## 2018-01-01 RX ORDER — IPRATROPIUM BROMIDE AND ALBUTEROL SULFATE 2.5; .5 MG/3ML; MG/3ML
3 SOLUTION RESPIRATORY (INHALATION) EVERY 4 HOURS PRN
Status: CANCELLED | OUTPATIENT
Start: 2018-01-01

## 2018-01-01 RX ORDER — MORPHINE SULFATE 2 MG/ML
1 INJECTION, SOLUTION INTRAMUSCULAR; INTRAVENOUS
Status: DISCONTINUED | OUTPATIENT
Start: 2018-01-01 | End: 2018-01-01

## 2018-01-01 RX ORDER — HYDRALAZINE HYDROCHLORIDE 20 MG/ML
10 INJECTION INTRAMUSCULAR; INTRAVENOUS ONCE
Status: DISCONTINUED | OUTPATIENT
Start: 2018-01-01 | End: 2018-01-01

## 2018-01-01 RX ORDER — ACETAMINOPHEN 650 MG/1
650 SUPPOSITORY RECTAL EVERY 4 HOURS PRN
Status: DISCONTINUED | OUTPATIENT
Start: 2018-01-01 | End: 2018-01-01

## 2018-01-01 RX ORDER — AMLODIPINE BESYLATE 5 MG/1
5 TABLET ORAL DAILY
Status: DISCONTINUED | OUTPATIENT
Start: 2018-01-01 | End: 2018-01-01

## 2018-01-01 RX ORDER — LORAZEPAM 2 MG/ML
1 INJECTION INTRAMUSCULAR EVERY 4 HOURS PRN
Status: CANCELLED | OUTPATIENT
Start: 2018-01-01

## 2018-01-01 RX ORDER — MELATONIN
325
Status: DISCONTINUED | OUTPATIENT
Start: 2018-01-01 | End: 2018-01-01

## 2018-01-01 RX ORDER — SENNOSIDES 8.6 MG
8.6 TABLET ORAL 2 TIMES DAILY
Status: DISCONTINUED | OUTPATIENT
Start: 2018-01-01 | End: 2018-01-01

## 2018-01-01 RX ORDER — AMOXICILLIN 500 MG
1200 CAPSULE ORAL DAILY
Status: DISCONTINUED | OUTPATIENT
Start: 2018-01-01 | End: 2018-01-01

## 2018-01-01 RX ORDER — MORPHINE SULFATE 10 MG/5ML
5 SOLUTION ORAL
Status: DISCONTINUED | OUTPATIENT
Start: 2018-01-01 | End: 2018-01-01

## 2018-01-01 RX ORDER — HALOPERIDOL 5 MG/ML
1 INJECTION INTRAMUSCULAR
Status: CANCELLED | OUTPATIENT
Start: 2018-01-01

## 2018-01-01 RX ORDER — ALBUMIN (HUMAN) 12.5 G/50ML
25 SOLUTION INTRAVENOUS EVERY 8 HOURS
Status: COMPLETED | OUTPATIENT
Start: 2018-01-01 | End: 2018-01-01

## 2018-01-01 RX ORDER — SCOLOPAMINE TRANSDERMAL SYSTEM 1 MG/1
1 PATCH, EXTENDED RELEASE TRANSDERMAL
Status: CANCELLED | OUTPATIENT
Start: 2018-01-01

## 2018-01-01 RX ORDER — HYDROCODONE BITARTRATE AND ACETAMINOPHEN 10; 325 MG/1; MG/1
2 TABLET ORAL AS NEEDED
Status: DISCONTINUED | OUTPATIENT
Start: 2018-01-01 | End: 2018-01-01 | Stop reason: HOSPADM

## 2018-01-01 RX ORDER — HALOPERIDOL 5 MG/ML
2 INJECTION INTRAMUSCULAR
Status: CANCELLED | OUTPATIENT
Start: 2018-01-01

## 2018-01-01 RX ORDER — METRONIDAZOLE 500 MG/100ML
500 INJECTION, SOLUTION INTRAVENOUS EVERY 8 HOURS
Status: DISCONTINUED | OUTPATIENT
Start: 2018-01-01 | End: 2018-01-01

## 2018-01-10 PROBLEM — E86.0 DEHYDRATION: Status: ACTIVE | Noted: 2018-01-01

## 2018-01-10 PROBLEM — N17.9 AKI (ACUTE KIDNEY INJURY) (HCC): Status: ACTIVE | Noted: 2018-01-01

## 2018-01-10 NOTE — H&P
[facility] Hospitalist H&P       CC: Patient presents with:  Fatigue (constitutional, neurologic)  Fall (musculoskeletal, neurologic)       PCP: Patricia Sims MD    History of Present Illness: Patient is a 80year old male with PMH sig for having increased abd Systems  Comprehensive ROS reviewed and negative except for what's stated above. Including negative for chest pain, shortness of breath, syncope.        OBJECTIVE:  /87   Pulse 92   Temp 98.4 °F (36.9 °C) (Oral)   Resp 20   Ht 185.4 cm (6' 1\")   Wt neuroendocrine tumors. COMPARISON STUDY: CT abdomen/pelvis, 4/13/2016.  TECHNIQUE:  Multiple axial images of abdomen and pelvis were obtained without infusion of IV contrast, following administration of the oral contrast medium, from the lung bases to the l scattered atherosclerotic changes of the abdominal aorta and iliac arteries. The inferior vena cava is difficult to visualize and shows long segment narrowing which remains of uncertain etiology.  Again, due to lack of IV contrast, further assessment remain further characterization is not possible. Clarke Claude PELVIC LYMPH NODES: No pelvic/inguinal lymphadenopathy. PELVIS OTHER: No pelvic mass lesions or loculated collections are present. OSSEOUS STRUCTURES: No significant focal osteolytic or osteoblastic lesions.  Multi by: Karissa Gonzalez MD on 1/10/2018 at 13:11     Approved by: Karissa Gonzalez MD            Xr Hip W Or Wo Pelvis 2 Or 3 Views, Left (cpt=73502)    Result Date: 12/12/2017  LEFT HIP AP AND LATERAL HISTORY: Other specified injuries of left hip, initial en hospitalist to continue to follow patient while in house    Patient and/or patient's family given opportunity to ask questions and note understanding and agreeing with therapeutic plan as outlined    Thank You,  Cy Patience, DO  [address]  Hospitalist  Pager [phone]

## 2018-01-10 NOTE — ED PROVIDER NOTES
Patient Seen in: [facility] Emergency Department    History   Patient presents with:  Fatigue (constitutional, neurologic)  Fall (musculoskeletal, neurologic)    Stated Complaint: fall, weakness, dehydration    HPI    17-year-old male, medical histo and negative except as noted above.     Physical Exam   ED Triage Vitals [01/10/18 1148]  BP: 155/98  Pulse: 107  Resp: 20  Temp: 101.5 °F (38.6 °C)  Temp src: Temporal  SpO2: 93 %  O2 Device: None (Room air)    Current:/91 (BP Location: Left arm)   P CULTURE - Abnormal; Notable for the following:     WBC Urine >50 (*)     RBC URINE 6-10 (*)     Bacteria Urine 1+ (*)     Squamous Epi.  Cells Moderate (*)     All other components within normal limits   POCT GLUCOSE - Abnormal; Notable for the following: (primary encounter diagnosis)  Dehydration    Disposition:  Admit  1/10/2018  4:13 pm    Follow-up:  No follow-up provider specified.       Medications Prescribed:  Current Discharge Medication List        Present on Admission  Date Reviewed: 1/9/2018

## 2018-01-10 NOTE — ED INITIAL ASSESSMENT (HPI)
Arrived via EMS, fall at home in bathroom, denies LOC.  Diarrhea started this am. New kdiney failure,

## 2018-01-11 NOTE — CONSULTS
[facility]  Report of Consultation    Francesca Centeno Patient Status:  Inpatient    1925 MRN [id number]   [facility] 4NW-A Attending Miryam Couch, [address] [facility] Day # 1 PCP Ashely Perez MD     REASON FOR CONSULTATION:     Esteban Plunkett reports that he does not drink alcohol or use drugs.     Allergies:  No Known Allergies    Medications:    Current Facility-Administered Medications:   •  Carboxymethylcellulose Sodium (CELLUVISC) 1 % ophthalmic solution 1 drop, 1 drop, Both Eyes, TID PRN good bowel sounds. Extremities: Pedal pulses are present. No edema. Neurological: Grossly intact.           DIAGNOSTIC WORK UP:     Laboratory Data:      Lab Results  Component Value Date   WBC 11.1 01/11/2018   HGB 8.6 01/11/2018   HCT 26.2 01/11/2018 solid-appearing nodule in the medial basal segment of left  lower lobe which was not present previously, needing correlation with a dedicated follow-up CT  chest, if clinically indicated. A large hiatal hernia, possibly paraesophageal, is again noted.   Zac Chandler inherently suboptimal by  computed tomography and therefore any abnormalities at this level cannot entirely be excluded. Correlation with the remainder of the clinical data and direct examination/visualization of this  area may be warranted.   No radiograp metastatic  lymphadenopathy. 5. Trace right upper quadrant ascites. 6. Atherosclerotic abdominal aorta and iliac arteries. 7. Large paraesophageal hiatal hernia. 8. Small left pleural effusion.   9. A 1.5 cm left lower lobe nodule is partially included mellitus / Dx [2/17] by microfilament ([facility] [address]     S/P [10/02] Colonoscopy / recheck 10 years / Risk > Benefit?      Encounter for long-term (current) use of medications     RONNA (acute kidney injury) ([facility]     Dehydration        ASSESSMENT AND PLAN:     Ad

## 2018-01-11 NOTE — PROGRESS NOTES
Patient's fish oil was discontinued while in-house as it is considered a supplement per P&T approved protocol. This supplement can be resumed at discharge using the discharge reconciliation process.

## 2018-01-11 NOTE — CONSULTS
[facility] Urology   Consultation Note    Mary Kramer Patient Status:  Inpatient    1925 MRN [id number]   [facility] 4NW-A Attending Gail Kayser, [address] [facility] Day # 1 PCP Shola Holly MD     Reason for Consultation:  H Urine cytology 10/31/17: atypical urothelial cells present  Urine cytology 12/28/17: no cytologic evidence of malignancy.       PVR 10/19/17: 241 cc    History:  Past Medical History:   Diagnosis Date   • Basal cell carcinoma 9/14/2010   • Blood disorde Pravastatin Sodium (PRAVACHOL) tab 20 mg, 20 mg, Oral, Nightly  •  0.9%  NaCl infusion, , Intravenous, Continuous  •  Heparin Sodium (Porcine) 5000 UNIT/ML injection 5,000 Units, 5,000 Units, Subcutaneous, Q8H [address]  •  acetaminophen (TYLENOL) tab 650 mg, 65 degraded and inherently suboptimal for assessment of the soft tissues, vascular  structures and viscera due to lack of intravenous contrast.  LUNG BASES: No consolidations in the visualized lungs. Small left pleural effusion is new since 4/13/2016.    Muriel Fisher nodes, largest of which measures 1.3 x 1.2 cm. The findings  may be related to reactive or metastatic lymphadenopathy. BOWEL: Slight nodularity at the level of the anorectal junction is a nonspecific finding.  It should  be noted that assessment of the dis Bladder wall thickening may be related to urothelial neoplasm, causing moderate bilateral  hydronephrosis/hydroureter. 3. Focal nodular thickening of the distal rectal canal, as described.   4. 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    NPO  Consent to be signed if patient/son willing to proceed with procedure  Check final urine

## 2018-01-11 NOTE — PROGRESS NOTES
[facility] Hospitalist Progress Note     PCP: Varun Fernandez MD    SUBJECTIVE:  No CP, SOB, or N/V. Pt more lethargic today. States continued abdominal pain.     OBJECTIVE:  Temp:  [97.8 °F (36.6 °C)-98.8 °F (37.1 °C)] 98.8 °F (37.1 °C)  Pulse:  [] 94  R Daily   • folic acid  1 mg Oral Daily   • Cetirizine HCl  5 mg Oral Daily   • Levothyroxine Sodium  75 mcg Oral Before breakfast   • Pravastatin Sodium  20 mg Oral Nightly   • Heparin Sodium (Porcine)  5,000 Units Subcutaneous Q8H [facility]   • cefTRIAXone  1 g outpt - will ask onc to eval here given abnml on CT abd     Psoriasis  -holding MTX     Dispo - observe here       Questions/concerns were discussed with patient and/or family by bedside.     Total Time spent with patient and coordinating care:  35 minutes

## 2018-01-11 NOTE — PROGRESS NOTES
[facility] Pharmacy Note:  Renal Dose Adjustment for Cetirizine (ZYRTEC)    Joana Pratt has been prescribed Cetirizine (Zyrtec) 10 mg orally daily. Estimated Creatinine Clearance: 7.9 mL/min (based on SCr of 5.7 mg/dL (H)).     His calculated creatinine c

## 2018-01-11 NOTE — PLAN OF CARE
NURSING ADMISSION NOTE      Patient admitted via Cart  Oriented to room. Safety precautions initiated. Bed in low position. Call light in reach. Wife and son at bedside. Going home now. Pt placed on contact + isolation.  Pt had diarrhea at home [facility]

## 2018-01-11 NOTE — PROGRESS NOTES
Patient will have surgery tonight. Son signed consent for procedure. Pt remains npo. Pt given hydralozine for elevated b/p as ordered.

## 2018-01-11 NOTE — CM/SW NOTE
01/11/18 1500   CM/SW Referral Data   Referral Source Social Work (self-referral)   Reason for Referral Discharge planning   Informant Children   Pertinent Medical Hx   Primary Care Physician Name Maximo Piper M.D.)   Patient Info   Patient's Mental S

## 2018-01-11 NOTE — PHYSICAL THERAPY NOTE
PHYSICAL THERAPY EVALUATION - INPATIENT     Room Number: 433/433-A  Evaluation Date: 1/11/2018  Type of Evaluation: Initial  Physician Order: PT Eval and Treat    Presenting Problem:  RONNA  Reason for Therapy: Mobility Dysfunction and Discharge Planning after admit with dc to home in Sept.    SUBJECTIVE  \"I don't know. Hima Flynn \"    Patient self-stated goal is none stated.     OBJECTIVE  Precautions: Bed/chair alarm;Hard of hearing  Fall Risk: High fall risk    WEIGHT BEARING RESTRICTION  Weight Bearing Restrict patient currently need. ..   -   Moving to and from a bed to a chair (including a wheelchair)?: A Lot   -   Need to walk in hospital room?: A Lot   -   Climbing 3-5 steps with a railing?: A Lot       AM-PAC Score:  Raw Score: 14   PT Approx Degree of Impair independent bed mobility, transfers, and gait. The patient is below baseline and would benefit from skilled inpatient PT to address the above deficits to assist patient in returning to prior to level of function.   Subacute rehab is recommended for 12-14 d

## 2018-01-12 NOTE — OPERATIVE REPORT
[facility]  Operative Note     Joana Pratt Location: OR   CSN [id number] MRN [id number]   Admission Date 1/10/2018 Operation Date 1/11/2018   Attending Physician Netta Jean DO Operating Physician Yisel Espinoza MD       Patient Name: Sayra Rinku into the operating room placed on the operating table in supine position given general anesthesia. And the patient's legs were placed in the dorsal lithotomy position and perineum and genitalia were prepped and draped in usual fashion.   Then a [address] [place] [address] fluid drained. Several cc of this was collected and sent for culture and sensitivity.   Then the Glidewire was replaced in the left ureter and a 6 Egyptian 94 cm contour double-J stent was then inserted into the into the ureter with the proximal pigtail coil

## 2018-01-12 NOTE — PROGRESS NOTES
[facility]  Progress Note    Sydney Flores Patient Status:  Inpatient    1925 MRN [id number]   [facility] 4NW-A Attending Sally Hallman, DO   Hosp Day # 2 PCP Gary Talamantes MD     Subjective:    No new complaints     Skippy Oyster inguinal hernia repair - KENDRA Lacey     Hypertension associated with diabetes / RX: NONE due to orthosatic changes & syncope (HCC)     LESLIE (Obstructive Sleep Apnea) - doesn't use CPAP     Anemia [Macrocytic] due to MTX & Pernicious Anemia / I79 & Folic Acid le

## 2018-01-12 NOTE — PROGRESS NOTES
[facility] Hospitalist Progress Note     PCP: Carrie Dominguez MD    SUBJECTIVE:  No CP, SOB, or N/V.   Sitting up on bedside with PT.    OBJECTIVE:  Temp:  [97.5 °F (36.4 °C)-99.1 °F (37.3 °C)] 98.3 °F (36.8 °C)  Pulse:  [] 89  Resp:  [12-22] 16  BP: (116-1 ferrous sulfate  325 mg Oral Daily with breakfast   • finasteride  5 mg Oral Daily   • folic acid  1 mg Oral Daily   • Cetirizine HCl  5 mg Oral Daily   • Levothyroxine Sodium  75 mcg Oral Before breakfast   • Pravastatin Sodium  20 mg Oral Nightly   • Hep -follows with Dr. Espinoza Fair as outpt - Dr. Ade Munoz now following here. Apprec input.   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

## 2018-01-12 NOTE — PROGRESS NOTES
[facility]  Urology Progress Note    Mary Kramer Patient Status:  Inpatient    1925 MRN [id number]   [facility] 4NW-A Attending Gail Kayser, [address] Day # 2 PCP Shola Holly MD     Subjective:  Mary Kramer is a(n

## 2018-01-12 NOTE — OCCUPATIONAL THERAPY NOTE
Attempted to see pt for skilled OT eval this date. Pt is currently refusing stating, \"Dr. Loli Wyman told me to refuse therapy if you guys came to see me. \" RN made aware. Will re-attempt 1/13/2018 as pt will allow.  Walter Magana, 01/12/18, 1:54 PM

## 2018-01-12 NOTE — PLAN OF CARE
DISCHARGE PLANNING - CASE MANAGEMENT    • Discharge to post-acute care or home with appropriate resources Progressing        GENITOURINARY - ADULT    • Absence of urinary retention Progressing        Impaired Functional Mobility    • Achieve highest/safest

## 2018-01-12 NOTE — PHYSICAL THERAPY NOTE
PHYSICAL THERAPY TREATMENT NOTE - INPATIENT    Room Number: 433/433-A     Session: 1   Number of Visits to Meet Established Goals: 5    Presenting Problem: RONNA     History related to current admission: Pt was admitted from home on 1/10/2018 with RONNA.  Pt h Static Sitting: Fair -  Dynamic Sitting: Fair -           Static Standing: Poor +  Dynamic Standing: Poor +    ACTIVITY TOLERANCE  O2 Saturation: 98%  O2 Device: Nasal cannula  Liters of O2:  3  No shortness of breath    AM-PAC '6-Clicks' INPATIEN Position Sitting     Repetitions   10-15   Sets   1     Patient End of Session: Up in chair;Needs met;Call light within reach;RN aware of session/findings; All patient questions and concerns addressed    ASSESSMENT   Late entry, pt seen this AM.  Pt seen ongoing

## 2018-01-13 NOTE — PROGRESS NOTES
[facility] Hospitalist Progress Note     PCP: Carlo Abdi MD    SUBJECTIVE:  Having some bony pain  Blood culture positive for candida overnight  Started on micafungin    OBJECTIVE:  Temp:  [97.6 °F (36.4 °C)-98.7 °F (37.1 °C)] 98.2 °F (36.8 °C)  Pulse:  [79 Meds:     • micafungin (MYCAMINE) IVPB  100 mg Intravenous Q24H   • AmLODIPine Besylate  5 mg Oral Daily   • famoTIDine  20 mg Oral Daily   • hydrALAzine HCl  10 mg Intravenous Once   • cholecalciferol  2,000 Units Oral Daily   • ferrous sulfate  325 accuchecks     Neuroendocrine carcinoma   -follows with Dr. Ana Martínez as outpt - Dr. Guillaume Nunes now following here. Apprec input.   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

## 2018-01-13 NOTE — CONSULTS
INFECTIOUS DISEASE CONSULTATION    [facility] Patient Status:  Inpatient    1925 MRN [id number]   [facility] 4NW-A Attending Josué Fuentes, [address] [facility] Day # 3 PCP Franklin Sommer, Allergies    Medications:    Current Facility-Administered Medications:   •  Micafungin Sodium (MYCAMINE) 100 mg in sodium chloride 0.9 % 100 mL IVPB, 100 mg, Intravenous, Q24H  •  ipratropium-albuterol (DUONEB) nebulizer solution 3 mL, 3 mL, Nebulization, No murmurs. Abdomen: Soft, nontender, nondistended. Positive bowel sounds. Musculoskeletal: Full range of motion of all extremities. No swelling noted. Joints: no effusions  Skin: No lesions.  No erythema, no open wounds  Barker clear urine      Laborat result Updated: 01/11/18 1937   Specimen: Urine from Urine, clean catch     Urine Culture No Growth at 18-24 hrs.            Problem list reviewed:  Patient Active Problem List:     Anemia, pernicious Rx B-12 monthly     Psoriatic Arthritis Rx methotrexate retention/bilateral hydronephrosis, bladder pathology suspected  Urine culture no growth x 1 day, awaiting cystoscopic cx too    Has no intravascular device, or central lines, and no intra-abdominal focus    Ezio Cervantes is more then often resistant to flucona

## 2018-01-13 NOTE — PLAN OF CARE
RN asked patient why he told therapists Dr Chase Ernst told him he should refuse therapy. The son who is at the bedside states \"Dr Silva,his primary care is not really a fan of therapy. He told my father if he's not up to it & if he feels too weak to do it th

## 2018-01-13 NOTE — PLAN OF CARE
GENITOURINARY - ADULT    • Absence of urinary retention Progressing        Impaired Functional Mobility    • Achieve highest/safest level of mobility/gait Progressing          Patient received this am alert and oriented x 3, lungs clear, diminished on room

## 2018-01-13 NOTE — PLAN OF CARE
GENITOURINARY - ADULT    • Absence of urinary retention Progressing        Impaired Functional Mobility    • Achieve highest/safest level of mobility/gait Progressing        Patient received this am alert and oriented, lungs diminished on room air.  Abdomen

## 2018-01-13 NOTE — CONSULTS
[facility] Pharmacy Note:  Renal Dose Adjustment for Tramadol Jayda Simpler)    Lanny Maradiaga has been prescribed Tramadol (ULTRAM) 50 mg orally every 6 hours as needed for pain. Estimated Creatinine Clearance: 8.6 mL/min (based on SCr of 5.26 mg/dL (H)).     His

## 2018-01-13 NOTE — PROGRESS NOTES
[facility]  Progress Note    Joana Pratt Patient Status:  Inpatient    1925 MRN [id number]   [facility] 4NW-A Attending Netta Jean,    Hosp Day # 3 PCP Perry Rosales MD     Subjective:  Joana Pratt is a(n) 80 yea carcinoma [Carcinoid Tumor] - SÁNCHEZ Lockhart [HCC]     Hard of hearing / uses bilateral hearing aids      Erosive gastritis Dx by EGD (11/12) Rx --> none: risk > benefit     Kyphoscoliosis     Macular degeneration, bilateral, dry [facility] &/or A.  [address] [place] [address]

## 2018-01-13 NOTE — PLAN OF CARE
Impaired Functional Mobility    • Achieve highest/safest level of mobility/gait Not Progressing          GENITOURINARY - ADULT    • Absence of urinary retention Progressing            Pt slept uneventfully this shift.  No complaints of pain, darling draining

## 2018-01-14 NOTE — PLAN OF CARE
GENITOURINARY - ADULT    • Absence of urinary retention Progressing        Impaired Functional Mobility    • Achieve highest/safest level of mobility/gait Progressing            Pt slept uneventfully throughout the night.  Tramadol given at hs for complaint

## 2018-01-14 NOTE — PROGRESS NOTES
[facility] Hospitalist Progress Note     PCP: Leeroy Lew MD    SUBJECTIVE:  Feeling better today  No acute events, no shortness of breath    OBJECTIVE:  Temp:  [97.9 °F (36.6 °C)-98.8 °F (37.1 °C)] 97.9 °F (36.6 °C)  Pulse:  [] 89  Resp:  [20] 20  BP: • AmLODIPine Besylate  5 mg Oral Daily   • famoTIDine  20 mg Oral Daily   • hydrALAzine HCl  10 mg Intravenous Once   • cholecalciferol  2,000 Units Oral Daily   • ferrous sulfate  325 mg Oral Daily with breakfast   • finasteride  5 mg Oral Daily   • fol metastatic carcinoid  -on sandostatin injections as outpt     Psoriasis  -holding MTX     Dispo - patient with Candidemia, repeat cultures pending and being treated with IV micafungin    Questions/concerns were discussed with patient and/or family by beka

## 2018-01-14 NOTE — PROGRESS NOTES
01/14/18 1409   Clinical Encounter Type   Visited With Patient; Health care provider   Routine Visit Introduction   Continue Visiting Yes   Patient Spiritual Encounters   Spiritual Needs Patient expresses a need for community   Spiritual Interventions Ch

## 2018-01-14 NOTE — PLAN OF CARE
Patient alert and oriented x4 with some forgetfullness. Patient is hard of hearing with right hearing aid. Patient on IV antibiotics. Patient with darling post surgery and to remain in and follow up with urology as outpatient.  Darling draining yellow/cloudy ur

## 2018-01-15 NOTE — PROGRESS NOTES
01/14/18 1824   Clinical Encounter Type   Visited With Patient   Routine Visit Introduction   Continue Visiting No   Patient's Supportive Strategies/Resources  provided emotional support, including active listening and acknowledgement of concern

## 2018-01-15 NOTE — PROGRESS NOTES
[facility]  Urology Progress Note    Ewelina Alvaro Patient Status:  Inpatient    1925 MRN [id number]   [facility] 4NW-A Attending Demetrius Murdock MD   [facility] Day # 5 PCP Rodriguez Mckay MD     Subjective:  Ewelina Alvaro is a ureteral stents and darling catheter  Candida glabrata blood stream infection  RONNA/CKD    Plan:    Check repeat blood culture results  Micafungin per ID  Await bladder biopsy results  Nephrology consultation per Dr. Bakari Huang with darling catheter at this t

## 2018-01-15 NOTE — PROGRESS NOTES
[facility]                INFECTIOUS DISEASE PROGRESS NOTE    Sydney Flores Patient Status:  Inpatient    1925 MRN [id number]   [facility] 4NW-A Attending Sheila Edgar MD   Hosp Day # 5 PCP Gary Talamantes MD     Antib Order Status: Completed Lab Status: Final result Updated: 01/15/18 0855   Specimen: Blood from Blood,peripheral     Blood Culture Result Candida glabrata (A)    Blood Culture Smear Yeast     Urine Culture, Routine [[id number]] Collected: 01/11/18 1920   O are normal (10/09)     MEDICARE WELLNESS VISIT - 2/14/17*     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1/12) Rx --> none: risk > benefit     Kyphoscolios

## 2018-01-15 NOTE — PHYSICAL THERAPY NOTE
PHYSICAL THERAPY TREATMENT NOTE - INPATIENT    Room Number: 433/433-A     Session: 2   Number of Visits to Meet Established Goals: 5    Presenting Problem: RONNA     History related to current admission: Pt was admitted from home on 1/10/2018 with RONNA.  Pt h Static Sitting: Fair -  Dynamic Sitting: Fair -           Static Standing: Poor +  Dynamic Standing: Poor    ACTIVITY TOLERANCE  O2 Saturation: 93%  Room air  No shortness of breath    AM-PAC '6-Clicks' INPATIENT SHORT FORM - BASIC MOBILITY  How much diffi Session: Up in chair;Needs met;Call light within reach;RN aware of session/findings; All patient questions and concerns addressed    ASSESSMENT   Pt seen for gait training, active flexibility and BLE strengthening, due to [facility] admission for RONNA.

## 2018-01-15 NOTE — PROGRESS NOTES
[facility] Hospitalist Progress Note     PCP: Blaise Gutierres MD    SUBJECTIVE:  No acute events overnight  Making good urine  Creatinine improving    OBJECTIVE:  Temp:  [97.4 °F (36.3 °C)-98.8 °F (37.1 °C)] 98.8 °F (37.1 °C)  Pulse:  [80-99] 99  Resp:  [18-20] • famoTIDine  20 mg Oral Daily   • hydrALAzine HCl  10 mg Intravenous Once   • cholecalciferol  2,000 Units Oral Daily   • ferrous sulfate  325 mg Oral Daily with breakfast   • finasteride  5 mg Oral Daily   • folic acid  1 mg Oral Daily   • Cetirizine H outpt     Psoriasis  -holding MTX     Dispo - patient with Candidemia, repeat cultures pending and being treated with IV micafungin, working on [employer] were discussed with patient and/or family by bedside.     Leah Nicolas   [facility]

## 2018-01-15 NOTE — OCCUPATIONAL THERAPY NOTE
OCCUPATIONAL THERAPY EVALUATION - INPATIENT     Room Number: 433/433-A  Evaluation Date: 1/15/2018  Type of Evaluation: Initial  Presenting Problem:  RONNA    Physician Order: IP Consult to Occupational Therapy  Reason for Therapy: ADL/IADL Dysfunction and [place] complete self care with increased time without assist. He stands in the tub for bathing. Uses bedside commode at night. Independent toileting and dressing.     SUBJECTIVE     Patient self-stated goal is not stated     OBJECTIVE  Precautions: Bed/chair alarm (A) to EOB. Completed UE testing. Total (A) LB dressing. Mod (A) sit to stand via RW. Transfers to bedside chair with min (A) of 2 via RW. Patient End of Session: Up in chair;Needs met;Call light within reach;RN aware of session/findings; All patient ques assessments, several treatment options    Overall Complexity MODERATE     OT Discharge Recommendations: Sub-acute rehabilitation (ELOS 12-14 days)  OT Device Recommendations: TBD    PLAN  OT Treatment Plan: Balance activities; ADL training;Functional transf

## 2018-01-15 NOTE — CONSULTS
[facility]  Report of Consultation    Alida Pearson Patient Status:  Inpatient    1925 MRN [id number]   [facility] 4NW-A Attending Aquiles Chen MD   Hosp Day # 5 PCP Luzmaria Matos MD       REASON FOR CONSULT:     RONNA    H II or unspecified type diabetes mellitus without mention of complication, not stated as uncontrolled    • Unspecified essential hypertension      Past Surgical History:  No date: CATARACT  No date: COLONOSCOPY  10/31/2018: OTHER SURGICAL HISTORY      Comme file.      PHYSICAL EXAM:     Vital Signs: /70 (BP Location: Right arm)   Pulse 95   Temp 98 °F (36.7 °C) (Tympanic)   Resp 18   Ht 6' 1\" (1.854 m)   Wt 156 lb (70.8 kg)   SpO2 96%   BMI 20.58 kg/m²   Temp (24hrs), Av.4 °F (36.9 °C), Min:97.4 °F 01/10/2018   NEPERCENT 78.8 01/14/2018   LYPERCENT 5.2 01/14/2018   MOPERCENT 13.8 01/14/2018   EOPERCENT 0.4 01/14/2018   BAPERCENT 0.2 01/14/2018   NE 8.87 (H) 01/14/2018   LYMABS 0.58 (L) 01/14/2018   MOABSO 1.55 (H) 01/14/2018   EOABSO 0.04 01/14/2018 a a(n) 80year old male w ho metastatic neuroendocrine cancer (on montly sandostatin injections), DM, gerd, hypothyroid, OA, LESLIE, HTN, pernicious anemia, BPH, psoriasis (previously on mtx) who presented initially w increased abdominal pain x 3 weeks.  Now w

## 2018-01-15 NOTE — PLAN OF CARE
GENITOURINARY - ADULT    • Absence of urinary retention Progressing          DISCHARGE PLANNING - CASE MANAGEMENT    • Discharge to post-acute care or home with appropriate resources Progressing                Assumed pt care at 0730. Aa/ox4, forgetful.  B

## 2018-01-16 NOTE — PROGRESS NOTES
[facility]  Nephrology Progress Note    Faviola Wiggins Attending:   Ortiz Gonzalez MD     SUBJECTIVE     Poor po intake   No n/v/d    PHYSICAL EXAM   Vital signs: /86 (BP Location: Left arm)   Pulse 86   Temp 98.6 °F (37 °C) (Oral)   Res (SYNTHROID, LEVOTHROID) tab 75 mcg 75 mcg Oral Before breakfast   Pravastatin Sodium (PRAVACHOL) tab 20 mg 20 mg Oral Nightly   Heparin Sodium (Porcine) 5000 UNIT/ML injection 5,000 Units 5,000 Units Subcutaneous Q8H [facility] & longterm   acetaminophen (TYLENOL) tab 650 m who presented initially w increased abdominal pain x 3 weeks.  Now w RONNA on CKD        RONNA on CKD4  -- Due to obstructive etiology / hydronephrosis/ureter  -- S/p cystogram, cystoscopy, multiple bladder biopsies, bilateral RGPG, bilateral ureteral washings

## 2018-01-16 NOTE — PLAN OF CARE
DISCHARGE PLANNING - CASE MANAGEMENT    • Discharge to post-acute care or home with appropriate resources Progressing        GENITOURINARY - ADULT    • Absence of urinary retention Progressing        Impaired Activities of Daily Living    • Achieve highest

## 2018-01-16 NOTE — PROGRESS NOTES
[facility]                INFECTIOUS DISEASE PROGRESS NOTE    Mere Wallace Patient Status:  Inpatient    1925 MRN [id number]   [facility] 4NW-A Attending Ken Walton MD   [address] Day # 6 PCP Aubrey Amador MD     Antib [id number].    Blood Culture FREQ X 2 [[id number]] (Abnormal) Collected: 01/10/18 1324   Order Status: Completed Lab Status: Final result Updated: 01/15/18 0822   Specimen: Blood from Blood,peripheral     Blood Culture Result Candida glabrata (A)    Blood [Macrocytic] due to MTX & Pernicious Anemia / H38 & Folic Acid levels are normal (10/09)     MEDICARE WELLNESS VISIT - 2/14/17*     Neuroendocrine carcinoma [Carcinoid Tumor] - SÁNCHEZ Lockhart [HCC]     Hard of hearing / uses bilateral hearing aids      Erosive ga

## 2018-01-16 NOTE — CM/SW NOTE
SW spoke w/Joanne from [facility]. [place] able to accept.

## 2018-01-16 NOTE — PROGRESS NOTES
Patient awake and alertx4. Sometimes forgetful. Room air. Tele. Patient son at bedside. Biopsy of bladder negative. Patient still on IV antifungal. Patient planning to dc to [facility] this week. Midline inserted for longterm antibiotics.

## 2018-01-16 NOTE — PROGRESS NOTES
[facility]  Urology Progress Note    Mike Finch Patient Status:  Inpatient    1925 MRN [id number]   [facility] 4NW-A Attending Tanmay Henriquez MD   [facility] Day # 6 PCP Carlota Rosenberg MD     Subjective:  Mike Finch is a malignancy.     B. Bladder, right lateral wall:  -Chronic cystitis with urothelial denudation.  -Negative for malignancy.     C. Bladder, left lateral wall:  -Chronic cystitis with urothelial denudation.  -Negative for malignancy.        Assessment:    POD

## 2018-01-16 NOTE — PROGRESS NOTES
[facility] Hospitalist Progress Note     PCP: Lindaann Riedel, MD    SUBJECTIVE:  Pt laying in bed. Some pain in stomach. oriented to location.      On albumin gtt, got bicarb    OBJECTIVE:  Temp:  [97.7 °F (36.5 °C)-99.7 °F (37.6 °C)] 97.7 °F (36.5 °C)  Pulse:  [ 1/17/2018] cholecalciferol  5,000 Units Oral Daily   • Normal Saline Flush  10 mL Intravenous Q12H   • micafungin (MYCAMINE) IVPB  100 mg Intravenous Q24H   • AmLODIPine Besylate  5 mg Oral Daily   • famoTIDine  20 mg Oral Daily   • hydrALAzine HCl  10 mg outpt  -no need for accuchecks     Neuroendocrine carcinoma   -follows with Dr. Basia Amezcua as outpt - Dr. Nicole Robledo now following here. Apprec input.   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

## 2018-01-17 NOTE — PROGRESS NOTES
Patient drowsy. Patient to go down for CT of abdomen/kidney to rule out kidney stones. Patient complaining of abdominal pain. Patient has midline. Clean dry intact. All needs addressed.

## 2018-01-17 NOTE — PROGRESS NOTES
[facility]  Nephrology Progress Note    Mary Kramer Attending:   Roseann Sahu MD     SUBJECTIVE     +co some abdominal pain today  Poor po intake  No n/v/d    PHYSICAL EXAM   Vital signs: /84 (BP Location: Left arm)   Pulse 80   Tem Oral Daily   folic acid (FOLVITE) tab 1 mg 1 mg Oral Daily   Cetirizine HCl (ZYRTEC) 5 MG tab 5 mg 5 mg Oral Daily   Levothyroxine Sodium (SYNTHROID, LEVOTHROID) tab 75 mcg 75 mcg Oral Before breakfast   Pravastatin Sodium (PRAVACHOL) tab 20 mg 20 mg Oral HTN, pernicious anemia, BPH, psoriasis (previously on mtx) who presented initially w increased abdominal pain x 3 weeks.  Now w RONNA on CKD        RONNA on CKD4  -- Due to obstructive etiology / hydronephrosis/ureter  -- S/p cystogram, cystoscopy, multiple loretta Nephrology

## 2018-01-17 NOTE — PHYSICAL THERAPY NOTE
Attempted to see patient. He politely requested to come back later. Will reattempt if schedule permits.     Duglas Arana PT

## 2018-01-17 NOTE — PROGRESS NOTES
[facility]                INFECTIOUS DISEASE PROGRESS NOTE    [facility] Patient Status:  Inpatient    1925 MRN [id number]   [facility] 4NW-A Attending Amanda Hernandez MD   [facility] Day # 7 PCP Patricia Sims MD     Antib report: [id number].    Blood Culture FREQ X 2 [[id number]] (Abnormal) Collected: 01/10/18 1324   Order Status: Completed Lab Status: Final result Updated: 01/15/18 0815   Specimen: Blood from Blood,peripheral     Blood Culture Result Candida glabrata (A) Lt IOL's - @ [facility]     S/P [3/17] Rt inguinal hernia repair - KENDRA Lacey     Hypertension associated with diabetes / RX: NONE due to orthosatic changes & syncope (HCC)     LESLIE (Obstructive Sleep Apnea) - doesn't use CPAP     Anemia [Macrocytic] due to MTX &

## 2018-01-17 NOTE — PROGRESS NOTES
[facility]  Urology Progress Note    Mike Finch Patient Status:  Inpatient    1925 MRN [id number]   [facility] 4NW-A Attending Richar Hu MD   [facility] Day # 7 PCP Carlota Rosenberg MD     Subjective:  Mike Finch day     Surgical pathology 1/11/18: Final Diagnosis:   A. Bladder, posterior wall:  -Chronic cystitis with urothelial denudation.  -Negative for malignancy.     B.  Bladder, right lateral wall:  -Chronic cystitis with urothelial denudation.  -Negative for

## 2018-01-18 NOTE — PHYSICAL THERAPY NOTE
IP PT attempted x 3, pt and family refusing stating pt is to go for procedure later this date. Will re-attempt as schedule permits.

## 2018-01-18 NOTE — PROGRESS NOTES
[facility]  Urology Progress Note    Ghanshyam Orlando Patient Status:  Inpatient    1925 MRN [id number]   [facility] 4NW-A Attending Naida Dumas MD   [facility] Day # 8 PCP Trinh Mcguire MD     Subjective:  Ghanshyam Orlando growth after 2 days     Surgical pathology 1/11/18: Final Diagnosis:   A. Bladder, posterior wall:  -Chronic cystitis with urothelial denudation.  -Negative for malignancy.     B.  Bladder, right lateral wall:  -Chronic cystitis with urothelial denudation calcifications are noted. No radiopaque renal calculi are seen. Upon filling of the Barker catheter within the patient's bladder, there is a trabeculated contour of the bladder with probable small bladder diverticula present.   This is suggestive of loretta

## 2018-01-18 NOTE — PLAN OF CARE
GENITOURINARY - ADULT    • Absence of urinary retention Not Progressing          Impaired Activities of Daily Living    • Achieve highest/safest level of independence in self care Progressing        Impaired Functional Mobility    • Achieve highest/safest

## 2018-01-18 NOTE — PROGRESS NOTES
[facility] Hospitalist Progress Note     Pt seen and examined 1/17/2018    PCP: Rudy Oconnell MD    SUBJECTIVE:  Pt laying in bed. More drowsy per son. Dec spo2 this AM, wheezing, CXR done.  Pt denies pain, did note some pressure during cystogram.     OBJECTIVE 100 mg Intravenous Q24H   • AmLODIPine Besylate  5 mg Oral Daily   • famoTIDine  20 mg Oral Daily   • hydrALAzine HCl  10 mg Intravenous Once   • ferrous sulfate  325 mg Oral Daily with breakfast   • finasteride  5 mg Oral Daily   • folic acid  1 mg Oral D anemia and anemia of CKD  -gets monthly B12 injections as outpt  -expect hgb will drop with IVF; baseline ~8     Hypothyroidism  -cont levothyroxine     HL  -cont statin     DM2  -not on meds; f/u as outpt  -no need for accuchecks     Neuroendocrine carcin

## 2018-01-18 NOTE — PROGRESS NOTES
[facility]  Nephrology Progress Note    Faviola Wiggins Attending: Ortiz Gonzalez MD     SUBJECTIVE     Lethargic, but A&O x3  No n/v/d  Poor po intake  UOP only 130cc/24hr.  Cr rising  CT w ?bilateral hydro    PHYSICAL EXAM   Vital signs: BP 5 mg 5 mg Oral Daily   folic acid (FOLVITE) tab 1 mg 1 mg Oral Daily   Cetirizine HCl (ZYRTEC) 5 MG tab 5 mg 5 mg Oral Daily   Levothyroxine Sodium (SYNTHROID, LEVOTHROID) tab 75 mcg 75 mcg Oral Before breakfast   Pravastatin Sodium (PRAVACHOL) tab 20 mg 2 Allowing for the limitations of the peripelvic cysts and a lack of IV contrast there does appear to be some degree of   bilateral hydronephrosis with suspected bilateral pyelocalyceal dilation.   There are bilateral ureteral stents identified in appropriate base of the bladder. There is bladder wall thickening. Bilateral ureteral stents are seen. There is a Barker catheter within the decompressed bladder.   LUNG BASES:  There are moderate bilateral pleural effusions with adjacent bibasilar airspace disease a ho metastatic neuroendocrine cancer (on montly sandostatin injections), DM, gerd, hypothyroid, OA, LESLIE, HTN, pernicious anemia, BPH, psoriasis (previously on mtx) who presented initially w increased abdominal pain x 3 weeks.  Now w RONNA on CKD        RONNA on susceptibility for infection/etc. Also his brother was on dialysis at 81yo and eventually decided to stop.  I encouraged the family to speak to each other and the patient when he returns to the room  -- Avoid nsaids, IV contrast, other nephrotoxins     Yessenia Poe

## 2018-01-18 NOTE — PROGRESS NOTES
[facility]                INFECTIOUS DISEASE PROGRESS NOTE    Janice Dupont Patient Status:  Inpatient    1925 MRN [id number]   [facility] 4NW-A Attending Yareli Manley MD   [facility] Day # 8 PCP Carrie Dominguez MD     Antib glabrata by PCR       Narrative:     For susceptibility see separate report: [id number].    Blood Culture FREQ X 2 [[id number]] (Abnormal) Collected: 01/10/18 1324   Order Status: Completed Lab Status: Final result Updated: 01/15/18 5329   Specimen: Blood Finasteride - ABRAHAM Osorio     Basal Cell Carcinoma - SÁNCHEZ Snow     S/P Rt & Lt IOL's - @ [facility]     S/P [3/17] Rt inguinal hernia repair - KENDRA Lacey     Hypertension associated with diabetes / RX: NONE due to orthosatic changes & syncope (HCC)     LESLIE (Obstru

## 2018-01-18 NOTE — CM/SW NOTE
Care Coordination Rounds held 1/18/2018. Treatment team members present today include , , Charge Nurse, and nurse caring for [employer].      Other care providers present:    Patient Active Problem List:     Anemia, pernic resolution prior to discharge: Decreased urine output. Cystogram to eval stents    Anticipated discharge date: pending clinical course    Current discharge plan:  Orquidea Mcleod RN, [facility]/CM  [phone]

## 2018-01-18 NOTE — CONSULTS
[address] Patient Status:  Inpatient    1925 MRN [id number]   [facility] 4NW-A Attending Viv Medellin MD   [facility] Day # 8 PCP Rudy Oconnell MD     Date of Admission: 1/10/2018 11:37 AM  Admission Diagn generalized, and psoriatic   • [facility]14[facility]2010   • Sleep apnea    • Type II or unspecified type diabetes mellitus without mention of complication, not stated as uncontrolled    • Unspecified essential hypertension       Past Surgi 1 capsule (300 mg total) by mouth daily with dinner. Disp: 30 capsule Rfl: 0   folic acid 1 MG Oral Tab Take 1 tablet (1 mg total) by mouth daily.  Disp: 90 tablet Rfl: 3   Diclofenac Sodium (VOLTAREN) 1 % Transdermal Gel Apply 4 g topically 4 (four) times mg, Oral, Daily  •  hydrALAzine HCl (APRESOLINE) injection 10 mg, 10 mg, Intravenous, Once  •  Carboxymethylcellulose Sodium (CELLUVISC) 1 % ophthalmic solution 1 drop, 1 drop, Both Eyes, TID PRN  •  ferrous sulfate EC tab 325 mg, 325 mg, Oral, Daily with 90 % -   01/18/18 0822 (!) 164/90 (!) 97.5 °F (36.4 °C) Oral 93 - 95 % -   01/18/18 0819 - - - - - 92 % -   01/18/18 0815 - - - - - (!) 87 % -   01/18/18 0500 153/85 99.1 °F (37.3 °C) Oral 84 18 91 % 166 lb 14.4 oz (75.7 kg)   01/18/18 0017 157/85 98.6 °F 15.0*   PLT  265.0  280.0       No results for input(s): PT, INR, PTT in the last 72 hours. Imaging: I have independently visualized all relevant chest imaging in PACS. I agree with the radiology interpretation except where noted.     CT a/p (1/17/18): and with naps  3.  Dispo:  -will continue to follow  -no pulmonary contraindication to proceeding with planned urologic procedure later today     Raymundo Foreman MD  1/18/2018  1:19 PM

## 2018-01-18 NOTE — OCCUPATIONAL THERAPY NOTE
Attempted to see pt for OT x3. Pt first off the floor for testing, then occupied with MD. Third trial family present and declining therapy secondary to plan for procedure this evening. Will re-attempt to see pt as appropriate and as able.

## 2018-01-18 NOTE — PLAN OF CARE
Patient alert x2, but drowsy this morning. Patient is hard of hearing with hearing aids. Patient with decreased O2 sats this morning, 87-88%. Patient placed on 2L of oxygen. Patient also with a cough and some expiratory wheezes - Dr. Angelica Flores notified.  Ruthie Barnhart

## 2018-01-18 NOTE — PROGRESS NOTES
[facility] Hospitalist Progress Note     Pt seen and examined 1/17/2018    PCP: Donis Hammans, MD    SUBJECTIVE:  Pt laying in bed, napping. Has been drowsy per RN. Reports he's \"at peace\". Says bowels irritated. OBJECTIVE:  Temp:  [97.5 °F (36.4 °C)-99. 1 • Pravastatin Sodium  20 mg Oral Nightly   • Heparin Sodium (Porcine)  5,000 Units Subcutaneous [facility]     • sodium bicarbonate infusion 150 mEq (01/18/18 0159)     TraMADol HCl, ipratropium-albuterol, Carboxymethylcellulose Sodium, acetaminophen, ondan

## 2018-01-19 NOTE — OCCUPATIONAL THERAPY NOTE
OCCUPATIONAL THERAPY EVALUATION - INPATIENT     Room Number: 433/433-A  Evaluation Date: 1/15/2018  Type of Evaluation: Re-evaluation and s/p stent exchange with general anesthesia  Presenting Problem:  RONNA    Physician Order: IP Consult to Occupational The Function: Prior to recent RILEY admission pt was able to complete self care with increased time without assist. He stands in the tub for bathing. Uses bedside commode at night. Independent toileting and dressing.     SUBJECTIVE   \"I've gotten so weak\"    Pa ASSESSMENT  Supine to Sit : Moderate assistance  Sit to Stand: Dependent assistance    Skilled Therapy Provided: Pt seen supine. Mod (A) to EOB. Engaged in eating task, mod (A) with min (A) for sitting balance. Total (A) sit to stand via RW.  Max (A) of 2 t MODERATE  3 - 5 performance deficits   Client Assessment/Performance Deficits MODERATE - Comorbidities and min to mod modifications of tasks    Clinical Decision Making MODERATE - Analysis of occupational profile, detailed assessments, several treatment op

## 2018-01-19 NOTE — ANESTHESIA POSTPROCEDURE EVALUATION
[address] Patient Status:  Inpatient   Age/Gender 80year old male MRN [id number]   Location [address] [facility] Attending Margorie Essex, MD   [facility] Day # 8 PCP Blaise Gutierres MD       Anesthesia Post-op

## 2018-01-19 NOTE — PROGRESS NOTES
[facility]  Urology Progress Note    Fredrick Terry Patient Status:  Inpatient    1925 MRN [id number]   [facility] [place] 4NW-A Attending Janett Norman MD   [address] Day # 9 PCP Leeroy Lew MD     Subjective:  Fredrick Terry malignancy.     B. Bladder, right lateral wall:  -Chronic cystitis with urothelial denudation.  -Negative for malignancy.     C. Bladder, left lateral wall:  -Chronic cystitis with urothelial denudation.  -Negative for malignancy.        Assessment:    POD

## 2018-01-19 NOTE — PROGRESS NOTES
[facility]  Nephrology Progress Note    Marianna Stark Attending: Hetal Paris MD     SUBJECTIVE     Sp stent exchange last night  Now w improved UOP and Cr slightly downtrending  Poor appetite.  Remains stable on 3: NC    PHYSICAL EXAM   V acid (FOLVITE) tab 1 mg 1 mg Oral Daily   Cetirizine HCl (ZYRTEC) 5 MG tab 5 mg 5 mg Oral Daily   Levothyroxine Sodium (SYNTHROID, LEVOTHROID) tab 75 mcg 75 mcg Oral Before breakfast   Pravastatin Sodium (PRAVACHOL) tab 20 mg 20 mg Oral Nightly   Heparin S to be some degree of   bilateral hydronephrosis with suspected bilateral pyelocalyceal dilation. There are bilateral ureteral stents identified in appropriate positions. .  There is some gas identified within the collecting systems of both kidneys which ma There is a Barker catheter within the decompressed bladder. LUNG BASES:  There are moderate bilateral pleural effusions with adjacent bibasilar airspace disease adjacent to the effusions which likely reflects atelectasis. There is as large hiatal hernia. evaluated fluoroscopically and with radiographs. COMPARISON:  None. INDICATIONS:  hydronephrosis, rising creatinine.   Assess patency of bilateral ureteral stents     PATIENT STATED HISTORY: (As transcribed by Technologist)  Patient offered no addit Last value was 2/66 on 11/2017. -- Cr was 5 on admit --> peak at 6.23 on 1/11 --> improving slowly to 4.48 --> but then started to rise again to mid 6s w min UOP.  CT w still bilateral hydro, and ?reflux on R and ?bladder outlet obstructions findings on c

## 2018-01-19 NOTE — PHYSICAL THERAPY NOTE
PHYSICAL THERAPY TREATMENT NOTE - INPATIENT    Room Number: 433/433-A     Session: 3   Number of Visits to Meet Established Goals: 5    Presenting Problem: RONNA     History related to current admission: Pt was admitted from home on 1/10/2018 with RONNA.  Pt h Static Sitting: Fair -  Dynamic Sitting: Fair -           Static Standing: Poor  Dynamic Standing: Poor -    ACTIVITY TOLERANCE  O2 Saturation: 94%  O2 Device: Nasal cannula  Liters of O2:  2.5    AM-PAC '6 10-15   Sets   1     Patient End of Session: Up in chair;Needs met;Call light within reach;RN aware of session/findings; All patient questions and concerns addressed    ASSESSMENT   Pt seen for gait training, active flexibility and BLE strengthening, due to

## 2018-01-19 NOTE — OPERATIVE REPORT
[address]    PATIENT'S NAME: Ovidio Jamie   ATTENDING PHYSICIAN: Loraine Akins. Louise Patel M.D. OPERATING PHYSICIAN: Chandu Muniz M.D.    PATIENT ACCOUNT#:   [de-identified]    LOCATION:  [address] [place] [address] [facility]  MEDICAL RECORD #:   [id number]       DATE OF ALVARO the renal pelvis, the flexible grasper was again used through the second port of the cystoscope and extracted. Next, a 26 cm 7-[place]izen [place] Percuflex stent was passed over the wire.   The wire was removed and a coil was seen in both renal pelvis as well as the b

## 2018-01-19 NOTE — CM/SW NOTE
SW met w/pt and pt's family. Pt's son stated they are still planning on having the pt dc to [facility]. No other needs at this time.

## 2018-01-19 NOTE — PLAN OF CARE
Impaired Activities of Daily Living    • Achieve highest/safest level of independence in self care Not Progressing    • Achieve highest/safest level of independence in self care Not Progressing        Impaired Functional Mobility    • Achieve highest/safes

## 2018-01-19 NOTE — PROGRESS NOTES
[address] Patient Status:  Inpatient    1925 MRN [id number]   [facility] 4NW-A Attending Joseluis Olson MD   [address] Day # 9 PCP Clare Bourne MD     SUBJECTIVE: Pt s/p cystoscopy with exchange of bilater acetaminophen (TYLENOL) tab 650 mg, 650 mg, Oral, Q6H PRN  •  ondansetron HCl (ZOFRAN) injection 4 mg, 4 mg, Intravenous, Q6H PRN      Physical Exam:                          General: alert, cooperative, in NAD                          HEENT: oropharynx cl continue to follow    Rodrigo Hugo MD  1/19/2018  3:28 PM

## 2018-01-19 NOTE — BRIEF OP NOTE
Pre-Operative Diagnosis: Hydronephrosis [N13.30]     Post-Operative Diagnosis: Hydronephrosis [N13.30]     Procedure Performed:   Procedure(s):  CYSTOSCOPY, EXCHANGE OF BILATERAL URETERAL STENTS     Surgeon(s) and Role:     * Catalina Wilson MD - [facility]

## 2018-01-19 NOTE — PROGRESS NOTES
[facility]                INFECTIOUS DISEASE PROGRESS NOTE    Michael Oseguera Patient Status:  Inpatient    1925 MRN [id number]   [facility] 4NW-A Attending Haydee Vasquez MD   [facility] Day # 9 PCP Jovana Caal MD     Antib Blood Culture FREQ X 2 [[id number]] (Abnormal) Collected: 01/10/18 1324   Order Status: Completed Lab Status: Final result Updated: 01/15/18 1135   Specimen: Blood from Blood,peripheral     Blood Culture Result Candida glabrata (A)    Blood Culture Smear MTX & Pernicious Anemia / L52 & Folic Acid levels are normal (10/09)     MEDICARE WELLNESS VISIT - 2/14/17*     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

## 2018-01-20 NOTE — PROGRESS NOTES
[facility] Hospitalist Progress Note     Pt seen and examined 1/17/2018    PCP: Reuben Hendrickson MD    SUBJECTIVE:  Attempted to see pt earlier, was using bedside commode    Pt laying in bed. Feels cold in room. Had diarrhea. On 3L. Nio pain, breathing ok.     OBJ Besylate  5 mg Oral Daily   • famoTIDine  20 mg Oral Daily   • hydrALAzine HCl  10 mg Intravenous Once   • ferrous sulfate  325 mg Oral Daily with breakfast   • finasteride  5 mg Oral Daily   • folic acid  1 mg Oral Daily   • Cetirizine HCl  5 mg Oral Mildred on meds; f/u as outpt  -no need for accuchecks     Neuroendocrine carcinoma   -follows with Dr. Ewelina Ball as outpt - Dr. Tosin Restrepo now following here. Apprec input.   LAD on CT is from metastatic carcinoid  -on sandostatin injections as outpt     Psoriasis  -holdi

## 2018-01-20 NOTE — PROGRESS NOTES
[address] Patient Status:  Inpatient    1925 MRN [id number]   [facility] 4NW-A Attending Maru Huff MD   Hosp Day # 10 PCP Donis Hammans, MD     SUBJECTIVE: No acute events overnight.   Pt making goo injection 5,000 Units, 5,000 Units, Subcutaneous, Q8H [address]  •  acetaminophen (TYLENOL) tab 650 mg, 650 mg, Oral, Q6H PRN  •  ondansetron HCl (ZOFRAN) injection 4 mg, 4 mg, Intravenous, Q6H PRN      Physical Exam:                          KPFVJY: RNXIO,  protocol  2. LESLIE:  -recommend CPAP at night and with naps  3.  Dispo:  -will follow peripherally, please call with further questions/concerns    Jennifer Smith MD  1/20/2018  2:30 PM

## 2018-01-20 NOTE — PROGRESS NOTES
[facility]  Nephrology Progress Note    Joana Pratt Attending: Vidal Felipe MD       SUBJECTIVE:     No cp, sob. Barker in place. UOP 2.45 L over 24 hours.     PHYSICAL EXAM:     Vital Signs: /62 (BP Location: Left arm)   Pulse 63 01/18/2018   RDW 14.6 01/18/2018   NEPRELIM 15.35 (H) 01/16/2018   NEUTABS 13.67 (H) 01/10/2018   LYMPHABS 0.15 (L) 01/10/2018   EOSABS 0.15 01/10/2018   BASABS 0.15 (H) 01/10/2018   NEUT 93 01/10/2018   LYMPH 1 01/10/2018   MON 4 01/10/2018   EOS 1 01/10/ ferrous sulfate EC tab 325 mg 325 mg Oral Daily with breakfast   finasteride (PROSCAR) tab 5 mg 5 mg Oral Daily   folic acid (FOLVITE) tab 1 mg 1 mg Oral Daily   Cetirizine HCl (ZYRTEC) 5 MG tab 5 mg 5 mg Oral Daily   Levothyroxine Sodium (SYNTHROID, LEV [place] [id number]    1/20/2018  1:54 PM

## 2018-01-20 NOTE — PROGRESS NOTES
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

## 2018-01-20 NOTE — PROGRESS NOTES
[facility] Hospitalist Progress Note     PCP: Maya Baker MD    SUBJECTIVE:  Pt sitting in chair. On RA. D/w son/wife at bedside.     OBJECTIVE:  Temp:  [97.4 °F (36.3 °C)-98.8 °F (37.1 °C)] 97.7 °F (36.5 °C)  Pulse:  [63-98] 98  Resp:  [18-20] 18  BP: (125-1 Intravenous Once   • ferrous sulfate  325 mg Oral Daily with breakfast   • finasteride  5 mg Oral Daily   • folic acid  1 mg Oral Daily   • Cetirizine HCl  5 mg Oral Daily   • Levothyroxine Sodium  75 mcg Oral Before breakfast   • Pravastatin Sodium  20 mg meds; f/u as outpt  -no need for accuchecks     Neuroendocrine carcinoma   -follows with Dr. Debbie Sandoval as outpt - Dr. Franklin Shelter now following here. Apprec input.   LAD on CT is from metastatic carcinoid  -on sandostatin injections as outpt monthly     Psoriasis  -

## 2018-01-20 NOTE — PLAN OF CARE
No change overnight, appears more alert today. Barker draining dasha urine,O2 at 3 liters. Denies pain.

## 2018-01-21 NOTE — PROGRESS NOTES
[address]  Nephrology Progress Note    Joane Hashimoto Attending: Ricco Mar MD       SUBJECTIVE:     Non-oliguric but less urine output compared to the previous day. Asking for water.     PHYSICAL EXAM:     Vital Signs: /82 (BP Lo NEUT 93 01/10/2018   LYMPH 1 01/10/2018   MON 4 01/10/2018   EOS 1 01/10/2018   BASO 1 01/10/2018   NEPERCENT 84.9 01/16/2018   LYPERCENT 3.9 01/16/2018   MOPERCENT 9.2 01/16/2018   EOPERCENT 0.2 01/16/2018   BAPERCENT 0.1 01/16/2018   NE 15.35 (H) 01/16 (ZYRTEC) 5 MG tab 5 mg 5 mg Oral Daily   Levothyroxine Sodium (SYNTHROID, LEVOTHROID) tab 75 mcg 75 mcg Oral Before breakfast   Pravastatin Sodium (PRAVACHOL) tab 20 mg 20 mg Oral Nightly   Heparin Sodium (Porcine) 5000 UNIT/ML injection 5,000 Units 5,000 PM

## 2018-01-21 NOTE — PROGRESS NOTES
[facility]  Progress Note    Tevin Jordan Patient Status:  Inpatient    1925 MRN [id number]   [facility] 4NW-A Attending Jelly Avila MD   [address] Day # 11 PCP Patricia Sims MD     Subjective:  Tevin Jordan is a(n) changes & syncope (HCC)     LESLIE (Obstructive Sleep Apnea) - doesn't use CPAP     Anemia [Macrocytic] due to MTX & Pernicious Anemia / A67 & Folic Acid levels are normal (10/09)     MEDICARE WELLNESS VISIT - 2/14/17*     Neuroendocrine carcinoma [Carcinoid

## 2018-01-21 NOTE — PLAN OF CARE
No change , appears more alert. Had a non productive cough sounds more congested but denies difficulty breathing. sats monitored closely , have remained 94% on 3 liters. Barker draining concentrated  dasha urine.

## 2018-01-21 NOTE — PROGRESS NOTES
[facility]                INFECTIOUS DISEASE PROGRESS NOTE    Jenni Sales Patient Status:  Inpatient    1925 MRN [id number]   [facility] 4NW-A Attending Ben Patino MD   Hosp Day # 10 PCP Matias Hennessy MD     Anti Blood Culture FREQ X 2 [[id number]] (Abnormal) Collected: 01/10/18 1324   Order Status: Completed Lab Status: Final result Updated: 01/15/18 9362   Specimen: Blood from Blood,peripheral     Blood Culture Result Candida glabrata (A)    Blood Culture Smear MTX & Pernicious Anemia / T77 & Folic Acid levels are normal (10/09)     MEDICARE WELLNESS VISIT - 2/14/17*     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

## 2018-01-21 NOTE — PLAN OF CARE
Absence of urinary retention Progressing      Hemodynamic stability and optimal renal function maintained Progressing      Skin integrity remains intact Progressing      Pt drowsy, forgetful. Nikolski with HAs.  CXR ordered to evaluate need for diuretics per nep

## 2018-01-22 NOTE — PROGRESS NOTES
[facility]                INFECTIOUS DISEASE PROGRESS NOTE    Lanny Meals Patient Status:  Inpatient    1925 MRN [id number]   [facility] 4NW-A Attending Erin Avila MD   [facility] Day # 15 PCP Rudy Oconnell MD     Anti           0.06 Suscept   5-Fluorocytosine                        <=0.06 None   Posaconazole                            0.12 None   Voriconazole                            0.06 None   Itraconazole                            0.06 None   Fluconazole           result Updated: 01/16/18 0941   Specimen:  Body fluid, unspecified from Urine, cystoscopic     Urine Culture >100,000 CFU/ML Candida glabrata (A)           Problem list reviewed:  Patient Active Problem List:     Anemia, pernicious Rx B-12 monthly     Psori infection  In association with urinary retention/bilateral hydronephrosis  -suspected stent infeciton  Sp stent exchange 1/17  Isolate appears sensitive to fluconazole with GERMANIA of 8  Can replace micafungin po fluconzole    2.  A/CRF, worsening renal functio

## 2018-01-22 NOTE — PROGRESS NOTES
[facility] [place]  Nephrology Progress Note    Michael Oseguera Attending:  Haydee Vasquez MD       SUBJECTIVE:     Underwent renal ultrasound yesterday which showed R hydronephrosis. Left kidney not visualized.   CXR yesterday showed worsening fluid overl 01/18/2018   NEPRELIM 15.35 (H) 01/16/2018   NEUTABS 13.67 (H) 01/10/2018   LYMPHABS 0.15 (L) 01/10/2018   EOSABS 0.15 01/10/2018   BASABS 0.15 (H) 01/10/2018   NEUT 93 01/10/2018   LYMPH 1 01/10/2018   MON 4 01/10/2018   EOS 1 01/10/2018   BASO 1 01/10/20 PRN   ferrous sulfate EC tab 325 mg 325 mg Oral Daily with breakfast   finasteride (PROSCAR) tab 5 mg 5 mg Oral Daily   folic acid (FOLVITE) tab 1 mg 1 mg Oral Daily   Cetirizine HCl (ZYRTEC) 5 MG tab 5 mg 5 mg Oral Daily   Levothyroxine Sodium (SYNTHROID, sulfate. Avoid IV iron w active infection.  Avoid epo w RONNA     Secondary hyperparathyroidism:  -- iPTH 239.3 in the setting of Vit d 21 and acceptable Ca and phos  -- continue Vit D repletion     Mild volume overload on CXR:  -- s/p lasix 40 mg IV x 1 yest

## 2018-01-22 NOTE — PROGRESS NOTES
[facility] Hospitalist Progress Note     PCP: Trinh Mcguire MD    SUBJECTIVE:  Pt laying in bed. On 2L.      OBJECTIVE:  Temp:  [97.6 °F (36.4 °C)-98.3 °F (36.8 °C)] 98.3 °F (36.8 °C)  Pulse:  [72-83] 83  Resp:  [16-18] 16  BP: (119-156)/(76-82) 140/78    Intak HCl  10 mg Intravenous Once   • ferrous sulfate  325 mg Oral Daily with breakfast   • finasteride  5 mg Oral Daily   • folic acid  1 mg Oral Daily   • Cetirizine HCl  5 mg Oral Daily   • Levothyroxine Sodium  75 mcg Oral Before breakfast   • Pravastatin So and anemia of CKD  -gets monthly B12 injections as outpt  -expect hgb will drop with IVF; baseline ~8     Hypothyroidism  -cont levothyroxine     HL  -cont statin     DM2  -not on meds; f/u as outpt  -no need for accuchecks     Neuroendocrine carcinoma   -

## 2018-01-22 NOTE — PLAN OF CARE
DISCHARGE PLANNING - CASE MANAGEMENT    • Discharge to post-acute care or home with appropriate resources Progressing        Impaired Activities of Daily Living    • Achieve highest/safest level of independence in self care Progressing    • Achieve highest

## 2018-01-22 NOTE — CM/SW NOTE
Care Coordination Rounds held 1/22/2018. Treatment team members present today include , , Charge Nurse, and nurse caring for [employer].      Other care providers present:    Patient Active Problem List:     Anemia, pernic resolution prior to discharge: Hydronephrosis, Cr 7.1. Pt not a good candidate for nephrostomy tubes. [address] discussion with son.         Anticipated discharge date: pending clinical course     Current discharge plan: Pending family decision    Back up dischar

## 2018-01-22 NOTE — PROGRESS NOTES
Patient is alert,oriented to self and place. No complaint of pain,Barker catheter in   draining dasha colored urine. Resting quietly at this time.

## 2018-01-22 NOTE — CM/SW NOTE
Per MD, Pt's kidneys are not functioning well. Potential for [address] conversation. SW to follow up. Pt accepted to [facility]. Follow up w/family regarding dc plan.  Not sure if they will want the pt to dc to [facility].

## 2018-01-22 NOTE — PROGRESS NOTES
[facility]  Urology Progress Note    Abramyg Latosha Patient Status:  Inpatient    1925 MRN [id number]   [facility] 4NW-A Attending Aga Vuong MD   [facility] Day # 15 PCP Carlo Abdi MD     Subjective:  Umer Reina is retrograde pyelograms demonstrated evidence of pyelo-caliectasis with dilation of the infundibulum.   There did not appear to be a distal obstruction identified on the retrograde pyelogram.  I told him it was my impression of the radiographic findings that

## 2018-01-22 NOTE — PROGRESS NOTES
[facility] Hospitalist Progress Note     [facility]      SUBJECTIVE/24 Hour Events:   Worsening urine output  Creatinine up-trending  Got lasix 40 mg IV yesterday     OBJECTIVE:  Temp:  [97.6 °F (36.4 °C)-98.6 °F (37 °C)] 97.7 °F (36.5 °C)  Pulse:  Ofilia Cellar pulmonary vascular congestion. There is increased patchy opacity in consolidation in the mid to lower lungs bilaterally that is concerning for worsening multifocal pneumonia or pulmonary edema. Clinical correlation  recommended.   Stable trace right and s vesicoureteral reflux was seen. CONCLUSION:  Spontaneous vesicoureteral reflux noted to the level of the mid right ureter. On the left no reflux was seen. Trabeculated contour of the bladder with probable bladder diverticula.   This may represent loretta FLUOROSCOPY TIME:  3min 9sec TECHNOLOGIST TIME:  20min RADIATION DOSE (AIR KERMA PRODUCT):  4488. 1uGy/m2   FINDINGS:  Numerous images were obtained during cystogram and bilateral retrograde pyelograms.   There are numerous diverticula with multiple focal ou for patient specific dose reduction were followed while maintaining the necessary diagnostic image quality.  FINDINGS: The images are degraded and inherently suboptimal for assessment of the soft tissues, vascular structures and viscera due to lack of intra assessed due to lack of IV contrast. Also noted are multiple mildly prominent left para-aortic lymph nodes, largest of which measures 1.3 x 1.2 cm. The findings may be related to reactive or metastatic lymphadenopathy.  BOWEL: Slight nodularity at the level due to lack of IV contrast with diminished diagnostic sensitivity. 2. Bladder wall thickening may be related to urothelial neoplasm, causing moderate bilateral hydronephrosis/hydroureter.  3. Focal nodular thickening of the distal rectal canal, as described inability to position arms above the head. KIDNEYS:  The patient has known bilateral parapelvic cysts and without IV contrast there is limitation evaluating for hydronephrosis.   Allowing for the limitations of the peripelvic cysts and a lack of IV contra degenerative disc disease noted in the lumbar spine and degenerative changes are seen in the thoracic and lumbar vertebra. PELVIC ORGANS:  Enlarged prostate effaces base of the bladder. There is bladder wall thickening.   Bilateral ureteral stents are seen additional history at this time    FINDINGS:  Stable cardiomegaly. Opacity within the mid to lower aspect of the left hemithorax likely representing combination of effusion and consolidation. Tiny right-sided pleural effusion. Mild vascular congestion. 1/18/2018 at 19:28     Approved by: Ovi Roldan MD               Meds:     No current outpatient prescriptions on file.       Current Facility-Administered Medications:  [START ON 1/23/2018] fluconazole (DIFLUCAN) tab 100 mg 100 mg Oral Daily   cholecalc with family about palliative care. - Also called family, son Ana Arthur to discuss. Family would still like to consider palliative, but not ready to make  Decision at this time.   Per son they would like oncology input as well since patient follows closely w

## 2018-01-23 NOTE — PROGRESS NOTES
[facility]                INFECTIOUS DISEASE PROGRESS NOTE    Ewelina John Patient Status:  Inpatient    1925 MRN [id number]   [facility] 4NW-A Attending Demetrius Murdock MD   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  A/CRF, worsening renal function, renal service follo

## 2018-01-23 NOTE — PROGRESS NOTES
[facility]  Nephrology Progress Note    Jenni Sales Attending:  Ben Patino MD       SUBJECTIVE:     Creatinine worsening. Anuric. Appetite intact. No cp, sob. Starting to develop nausea. C/o low back pain.     PHYSICAL EXAM:     Vital S BASABS 0.15 (H) 01/10/2018   NEUT 93 01/10/2018   LYMPH 1 01/10/2018   MON 4 01/10/2018   EOS 1 01/10/2018   BASO 1 01/10/2018   NEPERCENT 84.9 01/16/2018   LYPERCENT 3.9 01/16/2018   MOPERCENT 9.2 01/16/2018   EOPERCENT 0.2 01/16/2018   BAPERCENT 0.1 01 AmLODIPine Besylate (NORVASC) tab 5 mg 5 mg Oral Daily   famoTIDine (PEPCID) tab 20 mg 20 mg Oral Daily   hydrALAzine HCl (APRESOLINE) injection 10 mg 10 mg Intravenous Once   Carboxymethylcellulose Sodium (CELLUVISC) 1 % ophthalmic solution 1 drop 1 bethany minh     C glabrata fungemia and pyelonephritis:  -- on fluconazole  -- ID following, urology following     HTN: BP acceptable  -- continue amlodipine 5 mg daily     Anemia / pernicious anemia  -- on ferrous sulfate. Avoid IV iron w active infection.  [place]ell

## 2018-01-23 NOTE — CONSULTS
Consult noted, chart reviewed. Pt with neuroendocrine tumor and kidney failure, HD is not being recommended. Pt is appropriate for hospice care. Will ask [facility] liaison to provide informational meeting about options for care.   Consult place

## 2018-01-23 NOTE — CONSULTS
[facility]  Report of Consultation    Tammy Ho Patient Status:  Inpatient    1925 MRN [id number]   [facility] 4NW-A Attending Nik Law, DO   Hosp Day # 15 PCP Deangelo Mendoza MD     REASON FOR CONSULTATION:     Neuroe Medications:   •  fluconazole (DIFLUCAN) tab 100 mg, 100 mg, Oral, Daily  •  glucose (DEX4) oral liquid 15 g, 15 g, Oral, Q15 Min PRN **OR** Glucose-Vitamin C (DEX-4) 4-0.006 g chewable tab 4 tablet, 4 tablet, Oral, Q15 Min PRN **OR** dextrose 50% injectio rate 16, height 1.854 m (6' 1\"), weight 73.9 kg (162 lb 14.7 oz), SpO2 97 %.     Performance Status:3+   General: Patient is awake but very weak   Vital Signs: /84 (BP Location: Left arm)   Pulse 92   Temp 97.7 °F (36.5 °C) (Axillary)   Resp 16   Ht approximately 1.5 cm solid-appearing nodule in the medial basal segment of left  lower lobe which was not present previously, needing correlation with a dedicated follow-up CT  chest, if clinically indicated.   A large hiatal hernia, possibly paraesophageal anorectal region is inherently suboptimal by  computed tomography and therefore any abnormalities at this level cannot entirely be excluded.   Correlation with the remainder of the clinical data and direct examination/visualization of this  area may be laura metastatic  lymphadenopathy. 5. Trace right upper quadrant ascites. 6. Atherosclerotic abdominal aorta and iliac arteries. 7. Large paraesophageal hiatal hernia. 8. Small left pleural effusion.   9. A 1.5 cm left lower lobe nodule is partially included mellitus / Dx [2/17] by microfilament ([facility] [address]     S/P [10/02] Colonoscopy / recheck 10 years / Risk > Benefit?      Encounter for long-term (current) use of medications     RONNA (acute kidney injury) ([facility]     Dehydration        ASSESSMENT AND PLAN:     Ad

## 2018-01-23 NOTE — PALLIATIVE CARE NOTE
PC HORACE/Marla advised PCSW that per [facility] Consult she suggests that [facility] meets with family to discuss hospice services. PCSW contacted [facility] who states she will meet with pt and family around 2pm today.  Advised PC ASA

## 2018-01-23 NOTE — PROGRESS NOTES
[facility]  Urology Progress Note    Claireti Payam Patient Status:  Inpatient    1925 MRN [id number]   [facility] 4NW-A Attending Hugo Glasgow MD   [facility] Day # 15 PCP Donis Hammans, MD     Subjective:  Sheela Payam is

## 2018-01-23 NOTE — PROGRESS NOTES
[facility] Hospitalist Progress Note     [facility]      SUBJECTIVE/24 Hour Events:  Creatinine continues to rise  75 cc urine output documented  Sleepy this afternoon    OBJECTIVE:  Temp:  [97.7 °F (36.5 °C)-98.8 °F (37.1 °C)] 98.4 °F (36.9 °C)  Pulse:  [ at this time. FINDINGS:  There is cardiomegaly with pulmonary vascular congestion. There is increased patchy opacity in consolidation in the mid to lower lungs bilaterally that is concerning for worsening multifocal pneumonia or pulmonary edema.   Clini level of the mid right ureter. On the left no vesicoureteral reflux was seen. CONCLUSION:  Spontaneous vesicoureteral reflux noted to the level of the mid right ureter. On the left no reflux was seen.   Trabeculated contour of the bladder with probab pyelogram  FLUOROSCOPY IMAGES OBTAINED:  10 FLUOROSCOPY TIME:  3min 9sec TECHNOLOGIST TIME:  20min RADIATION DOSE (AIR KERMA PRODUCT):  9247. 1uGy/m2   FINDINGS:  Numerous images were obtained during cystogram and bilateral retrograde pyelograms.   There are exposure control and ALARA manual techniques for patient specific dose reduction were followed while maintaining the necessary diagnostic image quality.  FINDINGS: The images are degraded and inherently suboptimal for assessment of the soft tissues, vascula prominent lymph nodes which cannot be fully assessed due to lack of IV contrast. Also noted are multiple mildly prominent left para-aortic lymph nodes, largest of which measures 1.3 x 1.2 cm.  The findings may be related to reactive or metastatic lymphadeno 4/13/2016. IMPRESSION: 1. Compromised and degraded study due to lack of IV contrast with diminished diagnostic sensitivity. 2. Bladder wall thickening may be related to urothelial neoplasm, causing moderate bilateral hydronephrosis/hydroureter.  3. Focal and oral contrast, body habitus, clinical condition and inability to position arms above the head. KIDNEYS:  The patient has known bilateral parapelvic cysts and without IV contrast there is limitation evaluating for hydronephrosis.   Allowing for the meraz compression deformities of T10 and T11. Severe degenerative disc disease noted in the lumbar spine and degenerative changes are seen in the thoracic and lumbar vertebra. PELVIC ORGANS:  Enlarged prostate effaces base of the bladder.   There is bladder wall transcribed by Technologist)  Patient offered no additional history at this time    FINDINGS:  Stable cardiomegaly. Opacity within the mid to lower aspect of the left hemithorax likely representing combination of effusion and consolidation.   Tiny right-si placement. Dictated by: Carissa Matthews MD on 1/18/2018 at 19:28     Approved by: Carissa Matthews MD               Meds:     No current outpatient prescriptions on file.       Current Facility-Administered Medications:  fluconazole (DIFLUCAN) tab 100 mg 10 HLD, DM type 2, Neuroendocrin carcinoma who presented with RONNA on CKD s/p stent placement. Course c/b Candida glabrata fungemia secondary to urinary source.     # RONNA on CKD  - obstructive uropathy s/p ureteral stents and then stent exchange 1/18  - Nephrol comfort care orders tomorrow. Did discuss code status with patient's wife who is in agreement that status should be changed to DNR/DNI at this time.     Robyn Vazquez  [facility] Hospitalist

## 2018-01-24 NOTE — CM/SW NOTE
Care conference held :1/24/2018. Treatment team members present today include , , Charge Nurse, and nurse caring for Michael Oseguera.      Other care providers present: Patient's wife Juana Lynch, son Karmen Cheung and 2nd son from out of

## 2018-01-24 NOTE — PROGRESS NOTES
[address]  Nephrology Progress Note    Paul Means Attending:  Reji Hill,*       SUBJECTIVE:     Did not eat much breakfast.  No nausea, no metallic taste. No shortness of breath. Breathing well despite removing his O2 at times. 01/10/2018   LYMPHABS 0.15 (L) 01/10/2018   EOSABS 0.15 01/10/2018   BASABS 0.15 (H) 01/10/2018   NEUT 93 01/10/2018   LYMPH 1 01/10/2018   MON 4 01/10/2018   EOS 1 01/10/2018   BASO 1 01/10/2018   NEPERCENT 84.9 01/16/2018   LYPERCENT 3.9 01/16/2018   MOP ipratropium-albuterol (DUONEB) nebulizer solution 3 mL 3 mL Nebulization Q4H PRN   AmLODIPine Besylate (NORVASC) tab 5 mg 5 mg Oral Daily   famoTIDine (PEPCID) tab 20 mg 20 mg Oral Daily   hydrALAzine HCl (APRESOLINE) injection 10 mg 10 mg Intravenous On fungemia and pyelonephritis:  -- on fluconazole  -- ID following, urology following     HTN: BP acceptable  -- continue amlodipine 5 mg daily     Anemia / pernicious anemia  -- on ferrous sulfate. Avoid IV iron w active infection.  Avoid epo w RONNA Hurd

## 2018-01-24 NOTE — PLAN OF CARE
GENITOURINARY - ADULT    • Absence of urinary retention Progressing        Impaired Functional Mobility    • Achieve highest/safest level of mobility/gait Progressing        METABOLIC/FLUID AND ELECTROLYTES - ADULT    • Hemodynamic stability and optimal re

## 2018-01-24 NOTE — PLAN OF CARE
GENITOURINARY - ADULT    • Absence of urinary retention Not Progressing        Impaired Activities of Daily Living    • Achieve highest/safest level of independence in self care Not Progressing        Impaired Functional Mobility    • Achieve highest/safes

## 2018-01-24 NOTE — PROGRESS NOTES
[facility]  Urology Progress Note    Michael Oseguera Patient Status:  Inpatient    1925 MRN [id number]   [facility] 4NW-A Attending Haydee Vasquez MD   [facility] Day # 15 PCP Jovana Caal MD     Subjective:  Michael Oseguera is

## 2018-01-25 PROBLEM — N17.9 ACUTE RENAL FAILURE (HCC): Status: ACTIVE | Noted: 2018-01-01

## 2018-01-25 NOTE — PLAN OF CARE
Impaired Functional Mobility    • Achieve highest/safest level of mobility/gait Not Progressing        METABOLIC/FLUID AND ELECTROLYTES - ADULT    • Hemodynamic stability and optimal renal function maintained Not Progressing          SKIN/TISSUE INTEGRITY

## 2018-01-25 NOTE — PROGRESS NOTES
01/25/18 8710   Clinical Encounter Type   Visited With Family  (Pt.'s wife, son and nephew)   Routine Visit Follow-up   Continue Visiting (Family expressed appreciation for  support .   is available as needed on pager [phone].)   Patient's S

## 2018-01-25 NOTE — PLAN OF CARE
COPING    • Pt/Family able to verbalize concerns and demonstrate effective coping strategies Progressing        DEATH & DYING    • Pt/Family communicate acceptance of impending death and feel psychological comfort and peace Progressing        DISCHARGE SUKUMAR

## 2018-01-25 NOTE — PROGRESS NOTES
[facility] Hospitalist Progress Note     [facility]      SUBJECTIVE/24 Hour Events:  Feeling nauseated this morning  Patient passed out while on the commode, BP dropped to 27'J systolic  Improved with getting back in bed    OBJECTIVE:  Temp:  [97.3 °F (36. no additional history at this time. FINDINGS:  There is cardiomegaly with pulmonary vascular congestion.   There is increased patchy opacity in consolidation in the mid to lower lungs bilaterally that is concerning for worsening multifocal pneumonia or p was noted to the level of the mid right ureter. On the left no vesicoureteral reflux was seen. CONCLUSION:  Spontaneous vesicoureteral reflux noted to the level of the mid right ureter. On the left no reflux was seen.   Trabeculated contour of the bl INDICATIONS:  retrograde pyelogram  FLUOROSCOPY IMAGES OBTAINED:  10 FLUOROSCOPY TIME:  3min 9sec TECHNOLOGIST TIME:  20min RADIATION DOSE (AIR KERMA PRODUCT):  2021. 1uGy/m2   FINDINGS:  Numerous images were obtained during cystogram and bilateral retrogra work station. Automated exposure control and ALARA manual techniques for patient specific dose reduction were followed while maintaining the necessary diagnostic image quality.  FINDINGS: The images are degraded and inherently suboptimal for assessment of t of tortuous vessels and prominent lymph nodes which cannot be fully assessed due to lack of IV contrast. Also noted are multiple mildly prominent left para-aortic lymph nodes, largest of which measures 1.3 x 1.2 cm.  The findings may be related to reactive similar to 4/13/2016. IMPRESSION: 1. Compromised and degraded study due to lack of IV contrast with diminished diagnostic sensitivity.  2. Bladder wall thickening may be related to urothelial neoplasm, causing moderate bilateral hydronephrosis/hydrourete lack of IV and oral contrast, body habitus, clinical condition and inability to position arms above the head. KIDNEYS:  The patient has known bilateral parapelvic cysts and without IV contrast there is limitation evaluating for hydronephrosis.   Allowing BONES:  Stable compression deformities of T10 and T11. Severe degenerative disc disease noted in the lumbar spine and degenerative changes are seen in the thoracic and lumbar vertebra. PELVIC ORGANS:  Enlarged prostate effaces base of the bladder.   There HISTORY: (As transcribed by Technologist)  Patient offered no additional history at this time    FINDINGS:  Stable cardiomegaly. Opacity within the mid to lower aspect of the left hemithorax likely representing combination of effusion and consolidation. stent placement. Dictated by: Garcia Washington MD on 1/18/2018 at 19:28     Approved by: Garcia Washington MD               Meds:     No current outpatient prescriptions on file.       Current Facility-Administered Medications:  fluconazole (DIFLUCAN) tab 100 of CKD, HLD, DM type 2, Neuroendocrin carcinoma who presented with RONNA on CKD s/p stent placement. Course c/b Candida glabrata fungemia secondary to urinary source.  Patient with worsening renal failure, and in discussion with family have decided to transti

## 2018-01-25 NOTE — PROGRESS NOTES
[facility] Hospitalist Progress Note     [facility]      SUBJECTIVE/24 Hour Events:  Patient complaining of some diffuse pain- given PO pain meds  Sleepy but arousable    OBJECTIVE:  Temp:  [97.7 °F (36.5 °C)-98.5 °F (36.9 °C)] 98.5 °F (36.9 °C)  Pulse:  [ pulmonary vascular congestion. There is increased patchy opacity in consolidation in the mid to lower lungs bilaterally that is concerning for worsening multifocal pneumonia or pulmonary edema. Clinical correlation  recommended.   Stable trace right and s vesicoureteral reflux was seen. CONCLUSION:  Spontaneous vesicoureteral reflux noted to the level of the mid right ureter. On the left no reflux was seen. Trabeculated contour of the bladder with probable bladder diverticula.   This may represent loretta FLUOROSCOPY TIME:  3min 9sec TECHNOLOGIST TIME:  20min RADIATION DOSE (AIR KERMA PRODUCT):  3853. 1uGy/m2   FINDINGS:  Numerous images were obtained during cystogram and bilateral retrograde pyelograms.   There are numerous diverticula with multiple focal ou for patient specific dose reduction were followed while maintaining the necessary diagnostic image quality.  FINDINGS: The images are degraded and inherently suboptimal for assessment of the soft tissues, vascular structures and viscera due to lack of intra assessed due to lack of IV contrast. Also noted are multiple mildly prominent left para-aortic lymph nodes, largest of which measures 1.3 x 1.2 cm. The findings may be related to reactive or metastatic lymphadenopathy.  BOWEL: Slight nodularity at the level due to lack of IV contrast with diminished diagnostic sensitivity. 2. Bladder wall thickening may be related to urothelial neoplasm, causing moderate bilateral hydronephrosis/hydroureter.  3. Focal nodular thickening of the distal rectal canal, as described inability to position arms above the head. KIDNEYS:  The patient has known bilateral parapelvic cysts and without IV contrast there is limitation evaluating for hydronephrosis.   Allowing for the limitations of the peripelvic cysts and a lack of IV contra degenerative disc disease noted in the lumbar spine and degenerative changes are seen in the thoracic and lumbar vertebra. PELVIC ORGANS:  Enlarged prostate effaces base of the bladder. There is bladder wall thickening.   Bilateral ureteral stents are seen additional history at this time    FINDINGS:  Stable cardiomegaly. Opacity within the mid to lower aspect of the left hemithorax likely representing combination of effusion and consolidation. Tiny right-sided pleural effusion. Mild vascular congestion. 1/18/2018 at 19:28     Approved by: Fela Guzman MD               Meds:     No current outpatient prescriptions on file.       Current Facility-Administered Medications:  fluconazole (DIFLUCAN) tab 100 mg 100 mg Oral Daily   glucose (DEX4) oral liquid 15 presented with RONNA on CKD s/p stent placement. Course c/b Candida glabrata fungemia secondary to urinary source.     # RONNA on CKD  - obstructive uropathy s/p ureteral stents and then stent exchange 1/18  - Nephrology and urology c/s  - Urine output worsenin

## 2018-01-26 NOTE — DISCHARGE SUMMARY
General Medicine Discharge Summary     Patient ID:  Alida Pearson  80year old  7/28/1925    Admit date: 1/10/2018    Discharge date and time: 1/25/2018  3:25 PM     Attending Physician: Aquiles Chen MD    Primary Care Physician: Luzmaria Matos MD MCG/ACT Nasal Suspension    Levocetirizine Dihydrochloride 5 MG Oral Tab    methotrexate 2.5 MG Oral Tab    temazepam 15 MG Oral Cap    DM-Guaifenesin ER (MUCINEX DM)  MG Oral Tablet 12 Hr    ferrous sulfate 325 (65 FE) MG Oral Tab EC    Levothyroxin

## 2018-01-26 NOTE — PROGRESS NOTES
[facility] hospitalist daily note  Patient was seen/examined on 1/26/18    S: patient was given Morphine per hospice RN.  Lethargic during my eval    Medications in EPIC    PE;   01/26/18  1100   BP: 137/67   Pulse: 84   Resp: 18   Temp: 97.8 °F (36.6 °C)     Gen:

## 2018-01-26 NOTE — SPIRITUAL CARE NOTE
[facility] did visit for SC assessment present was pt, spouse Sintia Perez and son Ngoc Moran. Pt in bed sleeping, showed signs of discomfort via facial griminess. Family engaged able to talk and process her their feelings in an appropriate manner.  CHP offered supportive pre

## 2018-01-26 NOTE — H&P
[facility] Hospitalist H&P       CC: No chief complaint on file. PCP: Shazia Carmen MD    History of Present Illness:  Mr. Tanesha Moser is a 81 yo male with PMH Of CKD, neuroendocrine tumor with recent diagnosis of fungemia.  Patient was admitted for a prolong for this visit.     BP Readings from Last 3 Encounters:  01/25/18 : 115/55  01/09/18 : 142/90  12/20/17 : 140/76    Wt Readings from Last 3 Encounters:  01/23/18 : 162 lb 14.7 oz (73.9 kg)  01/09/18 : 148 lb (67.1 kg)  12/12/17 : 147 lb (66.7 kg)      Wt Re recommended. Stable trace right and small left pleural effusions. No pneumothorax. CONCLUSION:  There is cardiomegaly with pulmonary vascular congestion.   There is increased patchy opacity in consolidation in the mid to lower lungs bilaterally that diverticula. This may represent bladder outlet obstruction.       Dictated by: Terry Leigh MD on 1/18/2018 at 12:53     Approved by: Terry Leigh MD            Us Kidneys (GQD=[id number])    Result Date: 1/21/2018  PROCEDURE:  US KIDNEYS (OXR=[id number])  COMPARISON: diverticula with multiple focal outpouchings along the right left border of the urinary bladder. The largest diverticulum is located along the inferolateral aspect of the bladder on the left. There appears to be bilateral mild hydronephrosis.   The ureter structures and viscera due to lack of intravenous contrast. LUNG BASES: No consolidations in the visualized lungs. Small left pleural effusion is new since 4/13/2016.  Also noted is an approximately 1.5 cm solid-appearing nodule in the medial basal segment lymphadenopathy. BOWEL: Slight nodularity at the level of the anorectal junction is a nonspecific finding.  It should be noted that assessment of the distal rectum and anorectal region is inherently suboptimal by computed tomography and therefore any abnorm thickening of the distal rectal canal, as described. 4. Probable enlarged retroperitoneal lymph nodes which may be related to reactive or metastatic lymphadenopathy. 5. Trace right upper quadrant ascites.  6. Atherosclerotic abdominal aorta and iliac arteri of the peripelvic cysts and a lack of IV contrast there does appear to be some degree of bilateral hydronephrosis with suspected bilateral pyelocalyceal dilation. There are bilateral ureteral stents identified in appropriate positions. .  There is some gas thickening. Bilateral ureteral stents are seen. There is a Barker catheter within the decompressed bladder.  LUNG BASES:  There are moderate bilateral pleural effusions with adjacent bibasilar airspace disease adjacent to the effusions which likely reflect pleural effusion. Mild vascular congestion. Atheromatous changes of the aorta. Probable hiatal hernia. CONCLUSION:  1.  Stable cardiomegaly. 2.  Opacity within the left lung base likely represents combination of effusion and consolidation.  3.  Tiny placement. Course c/b Candida glabrata fungemia secondary to urinary source.  Patient with worsening renal failure, and in discussion with family have decided to transtion to hospice/comfort care.       # RONNA on CKD  - obstructive uropathy s/p ureteral sten

## 2018-01-26 NOTE — PLAN OF CARE
Discharge to home or other facility with appropriate resources Adequate for Discharge      Pt/Family communicate acceptance of impending death and feel psychological comfort and peace Progressing      Verbalizes/displays adequate comfort level or patient's

## 2018-01-26 NOTE — HOSPICE RN NOTE
Patient is obtunded opens eyes but does not track. Facial frown and grimace when legs touched. Request to floor nurse to administer morphine prior to care.

## 2018-01-27 NOTE — PROGRESS NOTES
[facility] hospitalist daily note  Patient was seen/examined on 1/27/18     S:awake during my visit, denies pain      Medications in EPIC     PE;   01/27/18  0500   BP: 138/78   Pulse: 77   Resp: 22   Temp: 98.5 °F (36.9 °C)     Gen:awake, no respiratory distress

## 2018-01-27 NOTE — PLAN OF CARE
DEATH & DYING    • Pt/Family communicate acceptance of impending death and feel psychological comfort and peace Progressing        PAIN - ADULT    • Verbalizes/displays adequate comfort level or patient's stated pain goal Progressing        Drowsy, open ey

## 2018-01-28 NOTE — PLAN OF CARE
DEATH & DYING    • Pt/Family communicate acceptance of impending death and feel psychological comfort and peace Progressing        PAIN - ADULT    • Verbalizes/displays adequate comfort level or patient's stated pain goal Progressing        Ewa Kennedy

## 2018-01-28 NOTE — HOSPICE RN NOTE
Pt slept through visit Morphine remains at 2mg/hr. Wife at bedside and teary eyed over prospect that patient may need to be moved from [facility]. No urine output. Agitation noted when attempting to wake.

## 2018-01-29 NOTE — PROGRESS NOTES
[facility] hospitalist daily note  Patient was seen/examined on 1/28/18     S: lethargic, awakens to stimuli, denies pain, family at bedside during my visit     Medications in EPIC     PE;   01/28/18  1249   BP: 111/67   Pulse: 76   Resp: 20   Temp: 97.7 °F (36.5

## 2018-01-29 NOTE — HOSPICE RN NOTE
GIP day 5- patients death appears imminent. During my visit he was unresponsive. Vitals taken and were 93/40, RR 10, 97.7, o2 sat 51-69% on 2lpm o2. Family present and emotional support was provided by Rns, MSW, and hospice chaplain.    Patient has been salas

## 2018-01-29 NOTE — PROGRESS NOTES
Pt having mild response to movement and pain. On 2L O2, sating in mid 70s. Family at bedside. Pt had some gurgling and increased breathing--morphine gtt increased to 3mg/hr and given prn robinol for secretions.

## 2018-01-29 NOTE — PROGRESS NOTES
[facility] hospitalist daily note  Patient was seen/examined on 1/29/18     S: lethargic,family at bedside      Medications in EPIC     PE;   01/29/18  1112   BP: 90/44   Pulse: 85   Resp: 12   Temp: (!) 97.5 °F (36.4 °C)     Gen: unresponsiveHEENT; eyes closed

## 2018-01-29 NOTE — PROGRESS NOTES
Assumed care at 1900. Patient is stuporous, son was at the bedside, pt's breathing is heavy,   Remain on Morphine drip, anuric, with swelling of BLE. Scattered bruises to arms and legs. BP trending down. Son is aware of patients condition, hospice.  Will c

## 2018-01-30 NOTE — PLAN OF CARE
Assumed care at 1900. Pt . Family at the bedside. MD here to speak w/family. Public safety notified. Left message for medical records. Paperwork fully completed. IV D/C. Pt taken by transport staff.

## 2018-01-30 NOTE — PROGRESS NOTES
Pt's family member notified nurse that pt having long periods of apnea. RN at bedside--pt at that time breathing 3 times/minute. --pt . Confirmed with charge nurse. Hospitalist and [facility] notified.

## 2018-01-30 NOTE — DISCHARGE SUMMARY
[facility]  Discharge Summary    Vesna Ramirez Patient Status:  Inpatient    1925 MRN [id number]   [facility] 4NW-A Attending No att. providers found   Hosp Day # 4 PCP Reuben Hendrickson MD     Date of Admission: 2018    Fran

## 2022-01-01 NOTE — HOSPICE RN NOTE
Met with patients family to discuss patients hospice benefit. Family verbalized that their goal was for this patient to be comfortable and they understood that patient does not have long to live with his renal function in its current state.    Today perry
Spoke with hospice medical director, Dr. Carina Hannon, patient meets criteria for inpatient level of care today due to increased lethargy/weakness , hypoxia, and episode of hypotension/loss of consciousness.  Patient reporting pain in BLE, says its a little wor
No

## 2022-10-28 NOTE — ANESTHESIA PREPROCEDURE EVALUATION
PRE-OP EVALUATION    Patient Name: Faviola Wiggins    Pre-op Diagnosis: Hydronephrosis [N13.30]    Procedure(s):  CYSTOSCOOPY, BILATERAL RETROGRADE, PYELOGRAM, EXCHANGE OF BILATERAL URETERAL STENTS     Surgeon(s) and Role:     Tammy Sexton MD - Mallory Tang Medications:    finasteride 5 MG Oral Tab Take 1 tablet (5 mg total) by mouth daily. Disp: 90 tablet Rfl: 3   TraMADol HCl 50 MG Oral Tab Take 1 tablet (50 mg total) by mouth every 8 (eight) hours as needed for Pain.  Disp: 50 tablet Rfl: 1   clotrimazole-b PLUS) 0.5 % Ophthalmic Solution 1 drop 3 (three) times daily as needed.  Disp:  Rfl:    Multiple Vitamins-Minerals (OCUVITE ADULT FORMULA OR) 2 tablets daily Disp:  Rfl:    Acetaminophen (TYLENOL EXTRA STRENGTH OR) Take 500 mg by mouth every 6 (six) hours a 19.9 (L) 01/09/2018   BUN 53 (H) 01/18/2018   BUN 47 (H) 01/09/2018   CREATSERUM 6.54 (H) 01/18/2018   CREATSERUM 5.07 (H) 01/09/2018    (H) 01/18/2018    (H) 01/09/2018   CA 7.2 (L) 01/18/2018            Airway      Mallampati: III  Mouth op Rituxan Pregnancy And Lactation Text: This medication is Pregnancy Category C and it isn't know if it is safe during pregnancy. It is unknown if this medication is excreted in breast milk but similar antibodies are known to be excreted.

## (undated) DEVICE — UNDYED BRAIDED (POLYGLACTIN 910), SYNTHETIC ABSORBABLE SUTURE: Brand: COATED VICRYL

## (undated) DEVICE — CATH URET CONE TIP 8FR 138008

## (undated) DEVICE — STANDARD HYPODERMIC NEEDLE,POLYPROPYLENE HUB: Brand: MONOJECT

## (undated) DEVICE — NON-ADHERENT PAD PREPACK: Brand: TELFA

## (undated) DEVICE — OPEN-END FLEXI-TIP URETERAL CATHETER: Brand: FLEXI-TIP

## (undated) DEVICE — NITINOL WIRE STR 038

## (undated) DEVICE — GLOVE BIOGEL M SURG SZ 6-1/2

## (undated) DEVICE — SOL  .9 3000ML

## (undated) DEVICE — SOLUTION SURG DURA PREP HAZMAT

## (undated) DEVICE — PLASTC TOOMEY SYRNG DISP

## (undated) DEVICE — STRL PENROSE DRAIN 18" X 1/4": Brand: CARDINAL HEALTH

## (undated) DEVICE — SUTURE PDS II 2-0 CT-2

## (undated) DEVICE — KENDALL SCD EXPRESS SLEEVES, KNEE LENGTH, MEDIUM: Brand: KENDALL SCD

## (undated) DEVICE — DERMABOND LIQUID ADHESIVE

## (undated) DEVICE — SOL  .9 1000ML BTL

## (undated) DEVICE — BREAST-HERNIA-PORT CDS-LF: Brand: MEDLINE INDUSTRIES, INC.

## (undated) DEVICE — SUTURE VICRYL 2-0

## (undated) DEVICE — CYSTO CDS-LF: Brand: MEDLINE INDUSTRIES, INC.

## (undated) DEVICE — VIOLET BRAIDED (POLYGLACTIN 910), SYNTHETIC ABSORBABLE SUTURE: Brand: COATED VICRYL

## (undated) DEVICE — GLOVE SURG SENSICARE SZ 8

## (undated) DEVICE — SUTURE VICRYL 3-0 SH

## (undated) DEVICE — ZIPWIRE GUIDEWIRE .038X150 STR

## (undated) DEVICE — SPONGE STICK WITH PVP-I: Brand: KENDALL

## (undated) DEVICE — CAUTERY CORD DISP E0503

## (undated) DEVICE — GLOVE SURG SENSICARE SZ 7-1/2

## (undated) NOTE — LETTER
[facility]  Darrell Hendrix 61 [address] [facility], [address] [place] [address]    Consent for Operation    Date: __________________    Time: _______________    1.  I authorize the performance upon Alida Pearson the following operation:    Procedure(s):  CYSTOSCOOROBERTO, BILAT procedure has been videotaped, the surgeon will obtain the original videotape. The hospital will not be responsible for storage or maintenance of this tape.     6. For the purpose of advancing medical education, I consent to the admittance of observers to t STATEMENTS REQUIRING INSERTION OR COMPLETION WERE FILLED IN.     Signature of Patient:   ___________________________    When the patient is a minor or mentally incompetent to give consent:  Signature of person authorized to consent for patient: ____________ supplements, and pills I can buy without a prescription (including street drugs/illegal medications). Failure to inform my anesthesiologist about these medicines may increase my risk of anesthetic complications.   · If I am allergic to anything or have had Anesthesiologist Signature     Date   Time  I have discussed the procedure and information above with the patient (or patient’s representative) and answered their questions. The patient or their representative has agreed to have anesthesia services.     ___

## (undated) NOTE — MR AVS SNAPSHOT
[facility]  [address] [facility],Fourth Floor, [address]  [address] [facility] [address] [phone]               Thank you for choosing us for your health care visit with Rashmi Panchal MD.  We are glad to serve you and happy to provide you with [facility] we cannot use more. Also because of the renal insufficiency we cannot take aspirin, ibuprofen or Aleve as it would only make the kidneys function poor. Therefore for pain you can use over-the-counter extra strength Tylenol 2 tablets twice a day.   A rare Take 1 tablet by mouth every evening.    What changed:    - when to take this  - reasons to take this   Commonly known as:  XYZAL           Levothyroxine Sodium 75 MCG Tabs   TAKE ONE TABLET ONCE DAILY   Commonly known as:  Jennifer Natarajan

## (undated) NOTE — IP AVS SNAPSHOT
[facility] [place] [address] [place], [address] ~ [phone]                Discharge Summary   3/21/2017    Yoseph Ortiz           Admission Information        Provider Department    3/21/2017 Blake Mace MD  Pre-Op / Asc Apply topically 2 (two) times daily. [    ]    [    ]    [    ]    [    ]       cyanocobalamin 1000 MCG/ML Soln   Commonly known as:  VITAMIN B12        Inject 1 mL as directed every 30 (thirty) days.     Cathleen Gregory     [    ]    [    ]    [    ] [    ]    [    ]       temazepam 15 MG Caps   Commonly known as:  RESTORIL        Take 1 capsule (15 mg total) by mouth nightly as needed for Sleep.     Louvclaribel Madera     [    ]    [    ]    [    ]    [    ]       TraMADol HCl 50 MG Tabs   Commonly kno Extra-Strength Tylenol or Advil. Prescription pain medication can make you nauseated, light-headed, and/or constipated. It should be taken with food and discontinued if side effects develop.  Use of a natural laxative (i.e. Citrucel, Metamucil or stool soft · Pain not relieved with pain medication    For life threatening emergencies (unexpected chest pain, difficulty breathing) call 911.     If you had a Urinary Catheter:  · You may have some burning when you urinate  · This should subside shortly  · Drink ple Neutrophil % Lymphocyte % Monocyte % Eosinophil % Basophil % Prelim Neut Abs Final Neut Abs Lymphocyte Abso Monocyte Absolu Eosinophil Abso Basophil Absolu    (03/14/17)  64.4 (03/14/17)  19.8 (03/14/17)  11.3 (03/14/17)  3.9 -- -- (03/14/17)  3.48 (03/14 We want to hear from you       We want to hear from you, please share your experience with us by returning the survey you will receive in the mail. Thank you!         Zeus

## (undated) NOTE — LETTER
[facility]  Darrell Hendrix 61 [address] [facility], [address] [place] [address]    Consent for Operation    Date: __________________    Time: _______________    1.  I authorize the performance upon Vesna Ramirez the following operation:    Procedure(s):  Cystoscopy, bilate procedure has been videotaped, the surgeon will obtain the original videotape. The hospital will not be responsible for storage or maintenance of this tape.     6. For the purpose of advancing medical education, I consent to the admittance of observers to t STATEMENTS REQUIRING INSERTION OR COMPLETION WERE FILLED IN.     Signature of Patient:   ___________________________    When the patient is a minor or mentally incompetent to give consent:  Signature of person authorized to consent for patient: ____________ supplements, and pills I can buy without a prescription (including street drugs/illegal medications). Failure to inform my anesthesiologist about these medicines may increase my risk of anesthetic complications.   · If I am allergic to anything or have had Anesthesiologist Signature     Date   Time  I have discussed the procedure and information above with the patient (or patient’s representative) and answered their questions. The patient or their representative has agreed to have anesthesia services.     ___

## (undated) NOTE — LETTER
Last Revised 02/07/06  Obstructive Sleep Apnea Questionnaire    Clinical signs and symptoms suggesting the possibility of LESLIE    1. Predisposing physical characteristics (positive with any of the following present)  ? BMI 35kg/m²  ?  Craniofacial abnormalit pauses which are frightening to the observer, patient regularly falls asleep within minutes after being left unstimulated) in which case they should be treated as though they have severe sleep apnea.     The sleep laboratory’s assessment (none, mild, modera Point Total for B           C. Requirement for postoperative opioids.                Opioid requirement             Points   None 0    Low dose oral opiod 1    High dose oral opioids, parenteral or neuraxial opiods 3      Point Total for C        Estimation

## (undated) NOTE — IP AVS SNAPSHOT
[address]            (For Outpatient Use Only) Initial Admit Date: 9/13/2017   Inpt/Obs Admit Date: Inpt: 9/13/17 / Obs: N/A   Discharge Date:    Myla Castro:  [de-identified]   MRN: [de-identified]   CSN: [id number]        ENCOUNTER  Patient Subscriber Name:  Yury Germain :    Subscriber ID:  Pt Rel to Subscriber:    Hospital Account Financial Class: Medicare    2017

## (undated) NOTE — IP AVS SNAPSHOT
Patient Demographics     Address  Chelo Lee  [facility] [place] [address] Phone  [phone] [facility]) *Preferred*  [phone] [facility]) E-mail Address  [email or web address] [employer]ination      Emergency Contact(s)     Name Relation Home Work [address] hour before or 2 hours after meals. Same meal daily   Ad Morillo MD         OCUVITE ADULT FORMULA OR      2 tablets daily          RaNITidine HCl 300 MG Caps  Commonly known as:  ZANTAC      Take 1 capsule (300 mg total) by mouth daily with dinner.    J 100 mL IVPB 09/18/17 2001 New Bag      [id number] Piperacillin Sod-Tazobactam So (ZOSYN) 3.375 g in dextrose 5 % 100 mL IVPB 09/19/17 0819 New Bag      [id number] Pravastatin Sodium (PRAVACHOL) tab 20 mg 09/18/17 2001 Given      [id number] acetaminophen (TYLE Estimated GFR units: mL/min/1.73 square meters   eGFR calculated by the CKD-EPI equation.                 URINALYSIS WITH CULTURE REFLEX [[id number]] (Abnormal)  Resulted: 09/19/17 0250, Result status: Final result   Ordering provider:  Morris Agarwal MD Blood Culture Result No Growth 4 Days    Blood Culture FREQ X 2 [[id number]] Collected:  09/15/17 1147    Order Status:  Completed Lab Status:  Preliminary result Updated:  09/19/17 1300    Specimen:  Blood from Blood,peripheral      Blood Culture Result :  Jose Juan Jackson DO (Physician)         [facility] Hospitalist H&P       CC: Patient presents with:  Fever (infectious)       PCP: Shamika Maldonado MD    History of Present Illness: Patient is a 80year old male with PMH sig for[KM. 1]  Carcinoid, HTN, hyoth No Known Allergies     Home Medications:    No outpatient prescriptions have been marked as taking for the 9/13/17 encounter [facility] Encounter).       Soc Hx     Smoking status: Never Smoker    Smokeless tobacco: Never Used    Alcohol use No        Fam Hx ALT  14*   AST  17   ALB  3.1*       No results for input(s): TROP in the last 72 hours. Additional Diagnostics: ECG:[KM.1] ST with PVCs[KM. 2]    CXR: image personally reviewed[KM. 1] normal[KM. 2]    Radiology: Ct Brain Or Head ([id number])    Result Date: 9/ Index Registry. FINDINGS:   VENTRICLES/SULCI:  Ventricles and sulci are prominent consistent with atrophy. INTRACRANIAL:  Periventricular low-attenuation consistent with small vessel ischemic disease is moderate in degree.  There are no abnormal extraax STATED HISTORY: (As transcribed by Technologist)  Patient offered no additional history at this time. FINDINGS:  Medium-sized hiatal hernia. Tortuous thoracic aorta with calcified plaque. Cardiomegaly with normal pulmonary vascularity.  No pleural effusio Electronically signed by Josué Fuentes DO on 9/13/2017  4:13 PM   Attribution Cooper    KM. 1 - Josué Fuentes DO on 9/13/2017  3:31 PM  KM. 2 - Josué Fuentes DO on 9/13/2017  4:01 PM                        Consults - MD Consult Notes      Consults signed by WBC  8.3  7.2  8.0   PLT  164.0  158.0  177.0     Recent Labs   Lab  09/15/17   0535  09/16/17   0611  09/17/17   0651   GLU  124*  119*   --    BUN  41*  33*   --    CREATSERUM  2.01*  2.04*  2.06*   CA  7.6*  8.1*   --    NA  143  142   --    K  3.8  3.6 General Medicine Discharge Summary     Patient ID:  Lanny Maradiaga  80year old  7/28/1925    Admit date: 9/13/2017    Discharge date and time: 9/19/2017    Attending Physician: Juliane Miramontes -follows with heme chronically, ~8 is usual baseline  -drop suspected due to aggressive IVF resuscitation/dilutional.  No evidence for active bleeding  -transfuse 1 U PRBC 9/14.  -due for cyanocobalamin injection on 9/15 (ordered)     Carcinoid  -gets sand Take 1,000 Units by mouth daily. Multiple Vitamins-Minerals (OCUVITE ADULT FORMULA OR)  2 tablets daily    Acetaminophen (TYLENOL EXTRA STRENGTH OR)  Take 500 mg by mouth every 6 (six) hours as needed (pain).  As needed     Cyanocobalamin 1000 MCG/ML Inj 9/13/2017 from home with c/o of fall and weakness. Pt diagnosed with sepsis, acute cystitis w/ hematuria.         Therapy significant imaging (date, test, result):  CT of head negative for acute process     Therapy significant co-morbidities:DM, pernicious ACTIVITY TOLERANCE  Room air  No shortness of breath       AM-PAC '6-Clicks' INPATIENT SHORT FORM - BASIC MOBILITY  How much difficulty does the patient currently have. .. [NL.1]  -   Turning over in bed (including adjusting bedclothes, sheets and blankets)? long discussion with pt and family regarding discharge recommendations and therapy goals. Recommend pt ambulate with use of a RW at all times. [NL.1]    Patient End of Session: In bed;Needs met;Call light within reach;RN aware of session/findings; All patie Therapist    Filed:  9/18/2017 12:19 PM Date of Service:  9/18/2017 12:09 PM Status:  Signed    :  Festus Osullivan PT (Physical Therapist)        PHYSICAL THERAPY TREATMENT NOTE - INPATIENT    Room Number: 4299/3989-A     Session: 2  Number of Visit Patient’s self-stated goal is to increase activity/ambulation.     OBJECTIVE  Precautions: Hard of hearing    WEIGHT BEARING RESTRICTION  Weight Bearing Restriction: None                PAIN ASSESSMENT   Ratin  Location: denies pn       BALANCE     Stat Hip adduction squeezes  LAQ  Toe raises     Upper Extremity      Position Sitting     Repetitions   10   Sets   1     Patient End of Session: Up in chair;Needs met;Call light within reach;RN aware of session/findings; All patient questions and concerns addr Number of Visits to Meet Established Goals: 7    Presenting Problem: Acute cystitis w/ hematuria; sepsis    History related to current admission: Pt is 80year old male admitted on 9/13/2017 from home with c/o of fall and weakness.  Pt diagnosed with sepsi No shortness of breath    ACTIVITIES OF DAILY LIVING ASSESSMENT  AM-PAC ‘6-Clicks’ Inpatient Daily Activity Short Form  How much help from another person does the patient currently need…  -   Putting on and taking off regular lower body clothing?: A Little PLAN  OT Treatment Plan: Balance activities; Energy conservation/work simplification techniques;ADL training;Functional transfer training;UE strengthening/ROM; Endurance training;Cognitive reorientation;Patient/Family education;Patient/Family training;Equipm Vitamin B-12 Up To 1000mcg, IM/SC Inj 08/18/17     Vitamin B-12 Up To 1000mcg, IM/SC Inj 07/14/17     Vitamin B-12 Up To 1000mcg, IM/SC Inj 06/14/17     Vitamin B-12 Up To 1000mcg, IM/SC Inj 05/10/17     Vitamin B-12 Up To 1000mcg, IM/SC Inj 04/12/17 Vitamin B-12 Up To 1000mcg, IM/SC Inj 06/17/14     Vitamin B-12 Up To 1000mcg, IM/SC Inj 05/19/14     Vitamin B-12 Up To 1000mcg, IM/SC Inj 04/21/14     Vitamin B-12 Up To 1000mcg, IM/SC Inj 03/24/14     Vitamin B-12 Up To 1000mcg, IM/SC Inj 02/24/14

## (undated) NOTE — LETTER
[facility]  Phone: [phone]  Right Fax: [phone]  [address] By:  Cheryl Redd RN Date: 3/16/17    Patient Name: Yoanna Ga  Surgery Date: 3/21/2017    CSN: [id number]  Medical Record: [id number]

## (undated) NOTE — LETTER
[facility]  Darrell Hendrix 61 [address] [facility], [address] [place] [address]    Consent for Operation    Date: __________________    Time: _______________    1.  I authorize the performance upon Ewelina John the following operation:    Procedure(s):  Christy Barrera revealed by the pictures or by descriptive texts accompanying them. If the procedure has been videotaped, the surgeon will obtain the original videotape. The hospital will not be responsible for storage or maintenance of this tape.     6. For the purpose of THAT MY DOCTOR PROVIDED ME WITH THE ABOVE EXPLANATIONS, THAT ALL BLANKS OR STATEMENTS REQUIRING INSERTION OR COMPLETION WERE FILLED IN.     Signature of Patient:   ___________________________    When the patient is a minor or mentally incompetent to give co · All of the medicines I take (including prescriptions, herbal supplements, and pills I can buy without a prescription (including street drugs/illegal medications).  Failure to inform my anesthesiologist about these medicines may increase my risk of anesthe _____________________________________________________________________________  Anesthesiologist Signature     Date   Time  I have discussed the procedure and information above with the patient (or patient’s representative) and answered their questions.  The

## (undated) NOTE — LETTER
[facility]  Darrell Hendrix 61 [address] [facility], [address] [place] [address]    Consent for Operation    Date: __________________    Time: _______________    1.  I authorize the performance upon Sadiq Reis the following operation:    Procedure(s):  Cystoscopy, bilate procedure has been videotaped, the surgeon will obtain the original videotape. The hospital will not be responsible for storage or maintenance of this tape.     6. For the purpose of advancing medical education, I consent to the admittance of observers to t STATEMENTS REQUIRING INSERTION OR COMPLETION WERE FILLED IN.     Signature of Patient:   ___________________________    When the patient is a minor or mentally incompetent to give consent:  Signature of person authorized to consent for patient: ____________ supplements, and pills I can buy without a prescription (including street drugs/illegal medications). Failure to inform my anesthesiologist about these medicines may increase my risk of anesthetic complications.   · If I am allergic to anything or have had Anesthesiologist Signature     Date   Time  I have discussed the procedure and information above with the patient (or patient’s representative) and answered their questions. The patient or their representative has agreed to have anesthesia services.     ___

## (undated) NOTE — LETTER
[facility]  Phone: [phone]  Right Fax: [phone]  [facility] [address] By:  Isamar Oswald RN Date: 3/15/17    Patient Name: Flavio Hickman  Surgery Date: 3/21/2017    CSN: [id number]  Medical Record: [id number]   DO

## (undated) NOTE — LETTER
[facility]  Darrell Hendrix [address] [facility], [address] [place] [address]    Consent for Operation    Date: __________________    Time: _______________    1.  I authorize the performance upon Umer Reina the following operation:    Procedure(s):  Cystoscopy, bilate procedure has been videotaped, the surgeon will obtain the original videotape. The hospital will not be responsible for storage or maintenance of this tape.     6. For the purpose of advancing medical education, I consent to the admittance of observers to t STATEMENTS REQUIRING INSERTION OR COMPLETION WERE FILLED IN.     Signature of Patient:   ___________________________    When the patient is a minor or mentally incompetent to give consent:  Signature of person authorized to consent for patient: ____________ supplements, and pills I can buy without a prescription (including street drugs/illegal medications). Failure to inform my anesthesiologist about these medicines may increase my risk of anesthetic complications.   · If I am allergic to anything or have had Anesthesiologist Signature     Date   Time  I have discussed the procedure and information above with the patient (or patient’s representative) and answered their questions. The patient or their representative has agreed to have anesthesia services.     ___

## (undated) NOTE — MR AVS SNAPSHOT
[facility]  [address] [facility],Fourth [address], [address]  [address] [place] [address] [phone]               Thank you for choosing us for your health care visit with Komal Gauthier MD.  We are glad to serve you and happy to provide you with this FOLLOW UP with Cici Olivares MD   [address] [facility] AT [facility])    [address] [place] [address] Drive  [address] [place] [address]   [phone]              Allergies as of Feb 10, 2017     No Known Allergies                Today Commonly known as:  IMODIUM A-D           Methotrexate Sodium 2.5 MG Tabs   Take 2 tablets (5 mg total) by mouth every 7 days.            OCUVITE ADULT FORMULA OR   2 tablets daily           Pantoprazole Sodium 40 MG Tbec   TAKE ONE TABLET BY MOUTH TWICE DA